# Patient Record
Sex: FEMALE | Race: WHITE | NOT HISPANIC OR LATINO
[De-identification: names, ages, dates, MRNs, and addresses within clinical notes are randomized per-mention and may not be internally consistent; named-entity substitution may affect disease eponyms.]

---

## 2017-04-28 ENCOUNTER — APPOINTMENT (OUTPATIENT)
Dept: ORTHOPEDIC SURGERY | Facility: CLINIC | Age: 72
End: 2017-04-28

## 2017-05-08 ENCOUNTER — APPOINTMENT (OUTPATIENT)
Dept: ORTHOPEDIC SURGERY | Facility: CLINIC | Age: 72
End: 2017-05-08

## 2017-05-26 ENCOUNTER — APPOINTMENT (OUTPATIENT)
Dept: ORTHOPEDIC SURGERY | Facility: CLINIC | Age: 72
End: 2017-05-26

## 2017-12-01 ENCOUNTER — APPOINTMENT (OUTPATIENT)
Dept: ORTHOPEDIC SURGERY | Facility: CLINIC | Age: 72
End: 2017-12-01
Payer: COMMERCIAL

## 2017-12-01 DIAGNOSIS — M67.911 UNSPECIFIED DISORDER OF SYNOVIUM AND TENDON, RIGHT SHOULDER: ICD-10-CM

## 2017-12-01 DIAGNOSIS — M25.511 PAIN IN RIGHT SHOULDER: ICD-10-CM

## 2017-12-01 PROCEDURE — 99213 OFFICE O/P EST LOW 20 MIN: CPT

## 2017-12-01 RX ORDER — ZOLPIDEM TARTRATE 10 MG/1
10 TABLET, FILM COATED ORAL
Qty: 30 | Refills: 0 | Status: ACTIVE | COMMUNITY
Start: 2017-04-13

## 2017-12-01 RX ORDER — ALPRAZOLAM 1 MG/1
1 TABLET ORAL
Qty: 60 | Refills: 0 | Status: ACTIVE | COMMUNITY
Start: 2017-08-04

## 2017-12-01 RX ORDER — METOPROLOL SUCCINATE 50 MG/1
50 TABLET, EXTENDED RELEASE ORAL
Qty: 90 | Refills: 0 | Status: ACTIVE | COMMUNITY
Start: 2016-11-02

## 2017-12-01 RX ORDER — OLMESARTAN MEDOXOMIL 40 MG/1
40 TABLET, FILM COATED ORAL
Qty: 90 | Refills: 0 | Status: ACTIVE | COMMUNITY
Start: 2017-10-05

## 2017-12-01 RX ORDER — GLIMEPIRIDE 2 MG/1
2 TABLET ORAL
Qty: 30 | Refills: 0 | Status: ACTIVE | COMMUNITY
Start: 2017-09-22

## 2017-12-01 RX ORDER — PROCHLORPERAZINE MALEATE 10 MG/1
10 TABLET ORAL
Qty: 30 | Refills: 0 | Status: ACTIVE | COMMUNITY
Start: 2017-09-22

## 2017-12-01 RX ORDER — IBANDRONATE SODIUM 150 MG/1
150 TABLET ORAL
Qty: 3 | Refills: 0 | Status: ACTIVE | COMMUNITY
Start: 2017-08-14

## 2018-01-02 ENCOUNTER — APPOINTMENT (OUTPATIENT)
Dept: ORTHOPEDIC SURGERY | Facility: AMBULATORY SURGERY CENTER | Age: 73
End: 2018-01-02

## 2018-01-08 ENCOUNTER — OUTPATIENT (OUTPATIENT)
Dept: OUTPATIENT SERVICES | Facility: HOSPITAL | Age: 73
LOS: 1 days | End: 2018-01-08

## 2018-01-08 VITALS
WEIGHT: 201.06 LBS | DIASTOLIC BLOOD PRESSURE: 88 MMHG | TEMPERATURE: 99 F | HEART RATE: 64 BPM | RESPIRATION RATE: 16 BRPM | SYSTOLIC BLOOD PRESSURE: 138 MMHG | HEIGHT: 67 IN

## 2018-01-08 DIAGNOSIS — E78.5 HYPERLIPIDEMIA, UNSPECIFIED: ICD-10-CM

## 2018-01-08 DIAGNOSIS — K42.9 UMBILICAL HERNIA WITHOUT OBSTRUCTION OR GANGRENE: Chronic | ICD-10-CM

## 2018-01-08 DIAGNOSIS — Z98.89 OTHER SPECIFIED POSTPROCEDURAL STATES: Chronic | ICD-10-CM

## 2018-01-08 DIAGNOSIS — K46.9 UNSPECIFIED ABDOMINAL HERNIA WITHOUT OBSTRUCTION OR GANGRENE: Chronic | ICD-10-CM

## 2018-01-08 DIAGNOSIS — M81.0 AGE-RELATED OSTEOPOROSIS WITHOUT CURRENT PATHOLOGICAL FRACTURE: ICD-10-CM

## 2018-01-08 DIAGNOSIS — Z90.12 ACQUIRED ABSENCE OF LEFT BREAST AND NIPPLE: Chronic | ICD-10-CM

## 2018-01-08 DIAGNOSIS — K40.90 UNILATERAL INGUINAL HERNIA, WITHOUT OBSTRUCTION OR GANGRENE, NOT SPECIFIED AS RECURRENT: Chronic | ICD-10-CM

## 2018-01-08 DIAGNOSIS — E11.9 TYPE 2 DIABETES MELLITUS WITHOUT COMPLICATIONS: ICD-10-CM

## 2018-01-08 DIAGNOSIS — S43.421A SPRAIN OF RIGHT ROTATOR CUFF CAPSULE, INITIAL ENCOUNTER: ICD-10-CM

## 2018-01-08 DIAGNOSIS — Z95.5 PRESENCE OF CORONARY ANGIOPLASTY IMPLANT AND GRAFT: ICD-10-CM

## 2018-01-08 DIAGNOSIS — Z98.1 ARTHRODESIS STATUS: Chronic | ICD-10-CM

## 2018-01-08 DIAGNOSIS — I25.10 ATHEROSCLEROTIC HEART DISEASE OF NATIVE CORONARY ARTERY WITHOUT ANGINA PECTORIS: Chronic | ICD-10-CM

## 2018-01-08 DIAGNOSIS — C50.912 MALIGNANT NEOPLASM OF UNSPECIFIED SITE OF LEFT FEMALE BREAST: Chronic | ICD-10-CM

## 2018-01-08 DIAGNOSIS — F41.9 ANXIETY DISORDER, UNSPECIFIED: ICD-10-CM

## 2018-01-08 DIAGNOSIS — I25.10 ATHEROSCLEROTIC HEART DISEASE OF NATIVE CORONARY ARTERY WITHOUT ANGINA PECTORIS: ICD-10-CM

## 2018-01-08 DIAGNOSIS — I10 ESSENTIAL (PRIMARY) HYPERTENSION: ICD-10-CM

## 2018-01-08 DIAGNOSIS — C50.919 MALIGNANT NEOPLASM OF UNSPECIFIED SITE OF UNSPECIFIED FEMALE BREAST: Chronic | ICD-10-CM

## 2018-01-08 DIAGNOSIS — I48.91 UNSPECIFIED ATRIAL FIBRILLATION: ICD-10-CM

## 2018-01-08 LAB
ALBUMIN SERPL ELPH-MCNC: 4.2 G/DL — SIGNIFICANT CHANGE UP (ref 3.3–5)
ALP SERPL-CCNC: 33 U/L — LOW (ref 40–120)
ALT FLD-CCNC: 16 U/L — SIGNIFICANT CHANGE UP (ref 4–33)
AST SERPL-CCNC: 24 U/L — SIGNIFICANT CHANGE UP (ref 4–32)
BILIRUB SERPL-MCNC: 0.2 MG/DL — SIGNIFICANT CHANGE UP (ref 0.2–1.2)
BUN SERPL-MCNC: 37 MG/DL — HIGH (ref 7–23)
CALCIUM SERPL-MCNC: 9.6 MG/DL — SIGNIFICANT CHANGE UP (ref 8.4–10.5)
CHLORIDE SERPL-SCNC: 105 MMOL/L — SIGNIFICANT CHANGE UP (ref 98–107)
CO2 SERPL-SCNC: 25 MMOL/L — SIGNIFICANT CHANGE UP (ref 22–31)
CREAT SERPL-MCNC: 1.34 MG/DL — HIGH (ref 0.5–1.3)
GLUCOSE SERPL-MCNC: 75 MG/DL — SIGNIFICANT CHANGE UP (ref 70–99)
HBA1C BLD-MCNC: 6 % — HIGH (ref 4–5.6)
HCT VFR BLD CALC: 35.9 % — SIGNIFICANT CHANGE UP (ref 34.5–45)
HGB BLD-MCNC: 11.3 G/DL — LOW (ref 11.5–15.5)
MCHC RBC-ENTMCNC: 28.3 PG — SIGNIFICANT CHANGE UP (ref 27–34)
MCHC RBC-ENTMCNC: 31.5 % — LOW (ref 32–36)
MCV RBC AUTO: 89.8 FL — SIGNIFICANT CHANGE UP (ref 80–100)
NRBC # FLD: 0 — SIGNIFICANT CHANGE UP
PLATELET # BLD AUTO: 404 K/UL — HIGH (ref 150–400)
PMV BLD: 13.1 FL — HIGH (ref 7–13)
POTASSIUM SERPL-MCNC: 5 MMOL/L — SIGNIFICANT CHANGE UP (ref 3.5–5.3)
POTASSIUM SERPL-SCNC: 5 MMOL/L — SIGNIFICANT CHANGE UP (ref 3.5–5.3)
PROT SERPL-MCNC: 7.2 G/DL — SIGNIFICANT CHANGE UP (ref 6–8.3)
RBC # BLD: 4 M/UL — SIGNIFICANT CHANGE UP (ref 3.8–5.2)
RBC # FLD: 14 % — SIGNIFICANT CHANGE UP (ref 10.3–14.5)
SODIUM SERPL-SCNC: 143 MMOL/L — SIGNIFICANT CHANGE UP (ref 135–145)
WBC # BLD: 11.72 K/UL — HIGH (ref 3.8–10.5)
WBC # FLD AUTO: 11.72 K/UL — HIGH (ref 3.8–10.5)

## 2018-01-08 NOTE — H&P PST ADULT - NEGATIVE GASTROINTESTINAL SYMPTOMS
no abdominal pain/no melena/no constipation/no change in bowel habits/no vomiting/no nausea/no diarrhea

## 2018-01-08 NOTE — H&P PST ADULT - PSH
Breast cancer  left lumpectomy with post op chemo and RT  CAD (coronary artery disease)  one cardiac stent placed in 1991 -- had surgery at [facility]  Hernia  diaphramatic hernia age 8  Inguinal hernia  right inguinal hernia repair  Recurrent breast cancer, left  1991 left mastectomy with reconstruction ("from the back") and the nipple -- no post op chemo or RT  S/P hernia repair    S/P lumpectomy, left breast    S/P mastectomy, left    S/P spinal fusion    Umbilical hernia

## 2018-01-08 NOTE — H&P PST ADULT - PMH
Anxiety    Atrial fibrillation    Atrial fibrillation    Breast cancer  left breast cancer diagnose din 1986 -- had lumpectomy with post op chemo and RT  Breast cancer  s/p left lumpectomy and mastectomy  CAD (coronary artery disease)    CAD (coronary artery disease)  s/p 1 stent (1999)  Diabetes  type II diagnosed in 2003 -- doesn't do finger sticks  Diabetes mellitus  type 2  Elevated WBC count  h/o having elevated WBCs -- as high as 13,000  HLD (hyperlipidemia)    HTN (hypertension)    Hypercholesterolemia    Hypertension    Lumbar spinal stenosis  November 2014  MI (myocardial infarction)  1999, then had cardiac stent inserted  Myocardial infarct    Obesity    Osteoporosis    Recurrent breast cancer, left  diagnosed in 1991 -- had surgery and no post op chemo or RT  Reflux    Sciatica neuralgia, left  in 2014 due to spinal stenosis

## 2018-01-08 NOTE — H&P PST ADULT - ALLERGY TYPES
see allergy list/outdoor environmental allergies/indoor environmental allergies/reactions to medicines

## 2018-01-08 NOTE — H&P PST ADULT - PROBLEM SELECTOR PLAN 5
Pt instructed to take meds as prescribed.  Metformin & Glimepiride instructions given  Accu-Chek ordered STAT for day of procedure  OR booking notification done

## 2018-01-08 NOTE — H&P PST ADULT - MUSCULOSKELETAL
negative detailed exam decreased ROM due to pain/normal strength/no joint warmth/no calf tenderness/no joint swelling/no joint erythema

## 2018-01-08 NOTE — H&P PST ADULT - FAMILY HISTORY
Mother  Still living? No  Family history of breast cancer, Age at diagnosis: Age Unknown     Father  Still living? No  Family history of emphysema, Age at diagnosis: Age Unknown

## 2018-01-08 NOTE — H&P PST ADULT - HISTORY OF PRESENT ILLNESS
73yo female with medical h/o Afib on Xarelto, HTN, CAD with stent placed 1991, T2DM, Osteoporosis, HDL and prior h/o Breast, left breast, mastectomy 1990 with chemo and radiation. Pt reports pain and decrease range of motion to right shoulder for a few years. Pt presents today for presurgical evaluation for Right Shoulder Arthroscopy scheduled for 1/11/2018.

## 2018-01-08 NOTE — H&P PST ADULT - RS GEN PE MLT RESP DETAILS PC
breath sounds equal/good air movement/airway patent/clear to auscultation bilaterally/respirations non-labored

## 2018-01-08 NOTE — H&P PST ADULT - NEGATIVE CARDIOVASCULAR SYMPTOMS
no dyspnea on exertion/no orthopnea/no paroxysmal nocturnal dyspnea/no peripheral edema/no palpitations/no chest pain

## 2018-01-11 ENCOUNTER — TRANSCRIPTION ENCOUNTER (OUTPATIENT)
Age: 73
End: 2018-01-11

## 2018-01-11 ENCOUNTER — OUTPATIENT (OUTPATIENT)
Dept: OUTPATIENT SERVICES | Facility: HOSPITAL | Age: 73
LOS: 1 days | Discharge: HOME | End: 2018-01-11

## 2018-01-11 VITALS
HEART RATE: 74 BPM | DIASTOLIC BLOOD PRESSURE: 69 MMHG | HEIGHT: 67 IN | WEIGHT: 201.06 LBS | RESPIRATION RATE: 16 BRPM | TEMPERATURE: 98 F | OXYGEN SATURATION: 98 % | SYSTOLIC BLOOD PRESSURE: 151 MMHG

## 2018-01-11 VITALS
OXYGEN SATURATION: 95 % | RESPIRATION RATE: 18 BRPM | DIASTOLIC BLOOD PRESSURE: 62 MMHG | SYSTOLIC BLOOD PRESSURE: 145 MMHG

## 2018-01-11 DIAGNOSIS — Z98.89 OTHER SPECIFIED POSTPROCEDURAL STATES: Chronic | ICD-10-CM

## 2018-01-11 DIAGNOSIS — K46.9 UNSPECIFIED ABDOMINAL HERNIA WITHOUT OBSTRUCTION OR GANGRENE: Chronic | ICD-10-CM

## 2018-01-11 DIAGNOSIS — Z98.1 ARTHRODESIS STATUS: Chronic | ICD-10-CM

## 2018-01-11 DIAGNOSIS — K40.90 UNILATERAL INGUINAL HERNIA, WITHOUT OBSTRUCTION OR GANGRENE, NOT SPECIFIED AS RECURRENT: Chronic | ICD-10-CM

## 2018-01-11 DIAGNOSIS — Z90.12 ACQUIRED ABSENCE OF LEFT BREAST AND NIPPLE: Chronic | ICD-10-CM

## 2018-01-11 DIAGNOSIS — K42.9 UMBILICAL HERNIA WITHOUT OBSTRUCTION OR GANGRENE: Chronic | ICD-10-CM

## 2018-01-11 DIAGNOSIS — I25.10 ATHEROSCLEROTIC HEART DISEASE OF NATIVE CORONARY ARTERY WITHOUT ANGINA PECTORIS: Chronic | ICD-10-CM

## 2018-01-11 DIAGNOSIS — C50.912 MALIGNANT NEOPLASM OF UNSPECIFIED SITE OF LEFT FEMALE BREAST: Chronic | ICD-10-CM

## 2018-01-11 DIAGNOSIS — S43.421A SPRAIN OF RIGHT ROTATOR CUFF CAPSULE, INITIAL ENCOUNTER: ICD-10-CM

## 2018-01-11 DIAGNOSIS — C50.919 MALIGNANT NEOPLASM OF UNSPECIFIED SITE OF UNSPECIFIED FEMALE BREAST: Chronic | ICD-10-CM

## 2018-01-11 LAB — GLUCOSE BLDC GLUCOMTR-MCNC: 104 MG/DL — HIGH (ref 70–99)

## 2018-01-11 NOTE — ASU DISCHARGE PLAN (ADULT/PEDIATRIC). - NOTIFY
Unable to Urinate/Increased Irritability or Sluggishness/Bleeding that does not stop/Pain not relieved by Medications/Numbness, color, or temperature change to extremity/Persistent Nausea and Vomiting/Inability to Tolerate Liquids or Foods/Swelling that continues/Fever greater than 101

## 2018-01-11 NOTE — ASU DISCHARGE PLAN (ADULT/PEDIATRIC). - ACTIVITY LEVEL
no heavy lifting/no weight bearing/no exercise/no sports/gym/quiet play/elevate extremity/no tub baths

## 2018-01-11 NOTE — ASU DISCHARGE PLAN (ADULT/PEDIATRIC). - SPECIAL INSTRUCTIONS
Apply ice for 20 minutes several times per day for the next 24-48 hours, then as needed for comfort. No creams, lotions, powders  or ointments to incision site. Narcotic pain medication may cause nausea or constipation. Take medication with food. Increase fluids and fiber intake. Wear shoulder immobilizer at all times...

## 2018-01-12 ENCOUNTER — APPOINTMENT (OUTPATIENT)
Dept: ORTHOPEDIC SURGERY | Facility: CLINIC | Age: 73
End: 2018-01-12

## 2018-06-25 NOTE — H&P PST ADULT - BACK
SCHEDULING [facility] LAB APPOINTMENTS ONLINE    Lab appointments can now be scheduled online at [email or web address]    · Go to [email or web address]  · In Search type Lab  · Click \"Lab services\"  · Click \"Schedule Your Test Online\"  · Follow the prompts  · If you
detailed exam

## 2018-09-09 ENCOUNTER — TRANSCRIPTION ENCOUNTER (OUTPATIENT)
Age: 73
End: 2018-09-09

## 2019-01-01 ENCOUNTER — INPATIENT (INPATIENT)
Facility: HOSPITAL | Age: 74
LOS: 2 days | Discharge: HOME | DRG: 247 | End: 2019-01-04
Attending: INTERNAL MEDICINE | Admitting: INTERNAL MEDICINE
Payer: COMMERCIAL

## 2019-01-01 VITALS
HEART RATE: 74 BPM | TEMPERATURE: 98 F | OXYGEN SATURATION: 95 % | SYSTOLIC BLOOD PRESSURE: 156 MMHG | RESPIRATION RATE: 18 BRPM | WEIGHT: 190.04 LBS | HEIGHT: 68 IN | DIASTOLIC BLOOD PRESSURE: 72 MMHG

## 2019-01-01 DIAGNOSIS — Z90.12 ACQUIRED ABSENCE OF LEFT BREAST AND NIPPLE: Chronic | ICD-10-CM

## 2019-01-01 DIAGNOSIS — R07.9 CHEST PAIN, UNSPECIFIED: ICD-10-CM

## 2019-01-01 DIAGNOSIS — I25.10 ATHEROSCLEROTIC HEART DISEASE OF NATIVE CORONARY ARTERY WITHOUT ANGINA PECTORIS: Chronic | ICD-10-CM

## 2019-01-01 DIAGNOSIS — C50.919 MALIGNANT NEOPLASM OF UNSPECIFIED SITE OF UNSPECIFIED FEMALE BREAST: Chronic | ICD-10-CM

## 2019-01-01 DIAGNOSIS — K46.9 UNSPECIFIED ABDOMINAL HERNIA WITHOUT OBSTRUCTION OR GANGRENE: Chronic | ICD-10-CM

## 2019-01-01 DIAGNOSIS — Z98.89 OTHER SPECIFIED POSTPROCEDURAL STATES: Chronic | ICD-10-CM

## 2019-01-01 DIAGNOSIS — Z98.1 ARTHRODESIS STATUS: Chronic | ICD-10-CM

## 2019-01-01 DIAGNOSIS — K40.90 UNILATERAL INGUINAL HERNIA, WITHOUT OBSTRUCTION OR GANGRENE, NOT SPECIFIED AS RECURRENT: Chronic | ICD-10-CM

## 2019-01-01 DIAGNOSIS — K42.9 UMBILICAL HERNIA WITHOUT OBSTRUCTION OR GANGRENE: Chronic | ICD-10-CM

## 2019-01-01 DIAGNOSIS — C50.912 MALIGNANT NEOPLASM OF UNSPECIFIED SITE OF LEFT FEMALE BREAST: Chronic | ICD-10-CM

## 2019-01-01 LAB
ALBUMIN SERPL ELPH-MCNC: 4.1 G/DL — SIGNIFICANT CHANGE UP (ref 3.3–5)
ALP SERPL-CCNC: 41 U/L — SIGNIFICANT CHANGE UP (ref 40–120)
ALT FLD-CCNC: 17 U/L — SIGNIFICANT CHANGE UP (ref 10–45)
ANION GAP SERPL CALC-SCNC: 17 MMOL/L — SIGNIFICANT CHANGE UP (ref 5–17)
ANION GAP SERPL CALC-SCNC: 17 MMOL/L — SIGNIFICANT CHANGE UP (ref 5–17)
APTT BLD: 36.8 SEC — HIGH (ref 27.5–36.3)
APTT BLD: >200 SEC — CRITICAL HIGH (ref 27.5–36.3)
AST SERPL-CCNC: 22 U/L — SIGNIFICANT CHANGE UP (ref 10–40)
BILIRUB SERPL-MCNC: 0.2 MG/DL — SIGNIFICANT CHANGE UP (ref 0.2–1.2)
BUN SERPL-MCNC: 45 MG/DL — HIGH (ref 7–23)
BUN SERPL-MCNC: 45 MG/DL — HIGH (ref 7–23)
CALCIUM SERPL-MCNC: 9.5 MG/DL — SIGNIFICANT CHANGE UP (ref 8.4–10.5)
CALCIUM SERPL-MCNC: 9.5 MG/DL — SIGNIFICANT CHANGE UP (ref 8.4–10.5)
CHLORIDE SERPL-SCNC: 104 MMOL/L — SIGNIFICANT CHANGE UP (ref 96–108)
CHLORIDE SERPL-SCNC: 106 MMOL/L — SIGNIFICANT CHANGE UP (ref 96–108)
CO2 SERPL-SCNC: 20 MMOL/L — LOW (ref 22–31)
CO2 SERPL-SCNC: 21 MMOL/L — LOW (ref 22–31)
CREAT SERPL-MCNC: 1.46 MG/DL — HIGH (ref 0.5–1.3)
CREAT SERPL-MCNC: 1.57 MG/DL — HIGH (ref 0.5–1.3)
GLUCOSE SERPL-MCNC: 155 MG/DL — HIGH (ref 70–99)
GLUCOSE SERPL-MCNC: 95 MG/DL — SIGNIFICANT CHANGE UP (ref 70–99)
HCT VFR BLD CALC: 35.3 % — SIGNIFICANT CHANGE UP (ref 34.5–45)
HGB BLD-MCNC: 11.6 G/DL — SIGNIFICANT CHANGE UP (ref 11.5–15.5)
INR BLD: 1.42 RATIO — HIGH (ref 0.88–1.16)
INR BLD: >15 RATIO (ref 0.88–1.16)
MCHC RBC-ENTMCNC: 27.8 PG — SIGNIFICANT CHANGE UP (ref 27–34)
MCHC RBC-ENTMCNC: 32.7 GM/DL — SIGNIFICANT CHANGE UP (ref 32–36)
MCV RBC AUTO: 84.9 FL — SIGNIFICANT CHANGE UP (ref 80–100)
PLATELET # BLD AUTO: 314 K/UL — SIGNIFICANT CHANGE UP (ref 150–400)
POTASSIUM SERPL-MCNC: 4.6 MMOL/L — SIGNIFICANT CHANGE UP (ref 3.5–5.3)
POTASSIUM SERPL-MCNC: 5.2 MMOL/L — SIGNIFICANT CHANGE UP (ref 3.5–5.3)
POTASSIUM SERPL-SCNC: 4.6 MMOL/L — SIGNIFICANT CHANGE UP (ref 3.5–5.3)
POTASSIUM SERPL-SCNC: 5.2 MMOL/L — SIGNIFICANT CHANGE UP (ref 3.5–5.3)
PROT SERPL-MCNC: 6.8 G/DL — SIGNIFICANT CHANGE UP (ref 6–8.3)
PROTHROM AB SERPL-ACNC: 16.4 SEC — HIGH (ref 10–12.9)
PROTHROM AB SERPL-ACNC: >200 SEC — SIGNIFICANT CHANGE UP (ref 10–12.9)
RBC # BLD: 4.16 M/UL — SIGNIFICANT CHANGE UP (ref 3.8–5.2)
RBC # FLD: 13 % — SIGNIFICANT CHANGE UP (ref 10.3–14.5)
SODIUM SERPL-SCNC: 141 MMOL/L — SIGNIFICANT CHANGE UP (ref 135–145)
SODIUM SERPL-SCNC: 144 MMOL/L — SIGNIFICANT CHANGE UP (ref 135–145)
TROPONIN T, HIGH SENSITIVITY RESULT: 15 NG/L — SIGNIFICANT CHANGE UP (ref 0–51)
TROPONIN T, HIGH SENSITIVITY RESULT: 16 NG/L — SIGNIFICANT CHANGE UP (ref 0–51)
WBC # BLD: 9.7 K/UL — SIGNIFICANT CHANGE UP (ref 3.8–10.5)
WBC # FLD AUTO: 9.7 K/UL — SIGNIFICANT CHANGE UP (ref 3.8–10.5)

## 2019-01-01 PROCEDURE — 71045 X-RAY EXAM CHEST 1 VIEW: CPT | Mod: 26

## 2019-01-01 PROCEDURE — 71275 CT ANGIOGRAPHY CHEST: CPT | Mod: 26

## 2019-01-01 PROCEDURE — 74174 CTA ABD&PLVS W/CONTRAST: CPT | Mod: 26

## 2019-01-01 RX ORDER — ASPIRIN/CALCIUM CARB/MAGNESIUM 324 MG
81 TABLET ORAL DAILY
Qty: 0 | Refills: 0 | Status: DISCONTINUED | OUTPATIENT
Start: 2019-01-01 | End: 2019-01-04

## 2019-01-01 RX ORDER — RIVAROXABAN 15 MG-20MG
20 KIT ORAL EVERY 24 HOURS
Qty: 0 | Refills: 0 | Status: DISCONTINUED | OUTPATIENT
Start: 2019-01-01 | End: 2019-01-01

## 2019-01-01 RX ORDER — METOPROLOL TARTRATE 50 MG
25 TABLET ORAL DAILY
Qty: 0 | Refills: 0 | Status: DISCONTINUED | OUTPATIENT
Start: 2019-01-01 | End: 2019-01-01

## 2019-01-01 RX ORDER — DIPHENHYDRAMINE HCL 50 MG
50 CAPSULE ORAL ONCE
Qty: 0 | Refills: 0 | Status: DISCONTINUED | OUTPATIENT
Start: 2019-01-01 | End: 2019-01-01

## 2019-01-01 RX ORDER — SODIUM CHLORIDE 9 MG/ML
1000 INJECTION, SOLUTION INTRAVENOUS
Qty: 0 | Refills: 0 | Status: DISCONTINUED | OUTPATIENT
Start: 2019-01-01 | End: 2019-01-04

## 2019-01-01 RX ORDER — SIMVASTATIN 20 MG/1
10 TABLET, FILM COATED ORAL AT BEDTIME
Qty: 0 | Refills: 0 | Status: DISCONTINUED | OUTPATIENT
Start: 2019-01-01 | End: 2019-01-01

## 2019-01-01 RX ORDER — ALPRAZOLAM 0.25 MG
1 TABLET ORAL DAILY
Qty: 0 | Refills: 0 | Status: DISCONTINUED | OUTPATIENT
Start: 2019-01-01 | End: 2019-01-04

## 2019-01-01 RX ORDER — ATORVASTATIN CALCIUM 80 MG/1
40 TABLET, FILM COATED ORAL AT BEDTIME
Qty: 0 | Refills: 0 | Status: DISCONTINUED | OUTPATIENT
Start: 2019-01-01 | End: 2019-01-04

## 2019-01-01 RX ORDER — DEXTROSE 50 % IN WATER 50 %
25 SYRINGE (ML) INTRAVENOUS ONCE
Qty: 0 | Refills: 0 | Status: DISCONTINUED | OUTPATIENT
Start: 2019-01-01 | End: 2019-01-04

## 2019-01-01 RX ORDER — FENOFIBRATE,MICRONIZED 130 MG
145 CAPSULE ORAL DAILY
Qty: 0 | Refills: 0 | Status: DISCONTINUED | OUTPATIENT
Start: 2019-01-01 | End: 2019-01-04

## 2019-01-01 RX ORDER — ONDANSETRON 8 MG/1
4 TABLET, FILM COATED ORAL ONCE
Qty: 0 | Refills: 0 | Status: COMPLETED | OUTPATIENT
Start: 2019-01-01 | End: 2019-01-01

## 2019-01-01 RX ORDER — INSULIN LISPRO 100/ML
VIAL (ML) SUBCUTANEOUS
Qty: 0 | Refills: 0 | Status: DISCONTINUED | OUTPATIENT
Start: 2019-01-01 | End: 2019-01-04

## 2019-01-01 RX ORDER — DEXTROSE 50 % IN WATER 50 %
12.5 SYRINGE (ML) INTRAVENOUS ONCE
Qty: 0 | Refills: 0 | Status: DISCONTINUED | OUTPATIENT
Start: 2019-01-01 | End: 2019-01-04

## 2019-01-01 RX ORDER — HYDROCORTISONE 20 MG
200 TABLET ORAL ONCE
Qty: 0 | Refills: 0 | Status: COMPLETED | OUTPATIENT
Start: 2019-01-01 | End: 2019-01-01

## 2019-01-01 RX ORDER — SODIUM CHLORIDE 9 MG/ML
1000 INJECTION, SOLUTION INTRAVENOUS ONCE
Qty: 0 | Refills: 0 | Status: COMPLETED | OUTPATIENT
Start: 2019-01-01 | End: 2019-01-01

## 2019-01-01 RX ORDER — METOPROLOL TARTRATE 50 MG
50 TABLET ORAL DAILY
Qty: 0 | Refills: 0 | Status: DISCONTINUED | OUTPATIENT
Start: 2019-01-01 | End: 2019-01-03

## 2019-01-01 RX ORDER — DEXTROSE 50 % IN WATER 50 %
15 SYRINGE (ML) INTRAVENOUS ONCE
Qty: 0 | Refills: 0 | Status: DISCONTINUED | OUTPATIENT
Start: 2019-01-01 | End: 2019-01-04

## 2019-01-01 RX ORDER — GLUCAGON INJECTION, SOLUTION 0.5 MG/.1ML
1 INJECTION, SOLUTION SUBCUTANEOUS ONCE
Qty: 0 | Refills: 0 | Status: DISCONTINUED | OUTPATIENT
Start: 2019-01-01 | End: 2019-01-04

## 2019-01-01 RX ORDER — DIPHENHYDRAMINE HCL 50 MG
50 CAPSULE ORAL ONCE
Qty: 0 | Refills: 0 | Status: COMPLETED | OUTPATIENT
Start: 2019-01-01 | End: 2019-01-01

## 2019-01-01 RX ORDER — LOSARTAN POTASSIUM 100 MG/1
100 TABLET, FILM COATED ORAL DAILY
Qty: 0 | Refills: 0 | Status: DISCONTINUED | OUTPATIENT
Start: 2019-01-01 | End: 2019-01-02

## 2019-01-01 RX ADMIN — Medication 200 MILLIGRAM(S): at 17:47

## 2019-01-01 RX ADMIN — Medication 50 MILLIGRAM(S): at 21:16

## 2019-01-01 RX ADMIN — ONDANSETRON 4 MILLIGRAM(S): 8 TABLET, FILM COATED ORAL at 17:44

## 2019-01-01 RX ADMIN — SODIUM CHLORIDE 2000 MILLILITER(S): 9 INJECTION, SOLUTION INTRAVENOUS at 17:51

## 2019-01-01 NOTE — ED ADULT NURSE NOTE - NSIMPLEMENTINTERV_GEN_ALL_ED
Implemented All Fall with Harm Risk Interventions:  [place] to call system. Call bell, personal items and telephone within reach. Instruct patient to call for assistance. Room bathroom lighting operational. Non-slip footwear when patient is off stretcher. Physically safe environment: no spills, clutter or unnecessary equipment. Stretcher in lowest position, wheels locked, appropriate side rails in place. Provide visual cue, wrist band, yellow gown, etc. Monitor gait and stability. Monitor for mental status changes and reorient to person, place, and time. Review medications for side effects contributing to fall risk. Reinforce activity limits and safety measures with patient and family. Provide visual clues: red socks.

## 2019-01-01 NOTE — H&P ADULT - ASSESSMENT
pt  with h/o afib/  xarelto, cad , mi,  stent  1999    now admitted  with  cp  and sob  tele   troponin   echo   may  need  cardiac cath    labs, pending  on  asa/  statin/ toprol  ct  scans, pending pt  with h/o afib/  xarelto, cad , mi,  stent  1999    now admitted  with  cp  and sob  tele   troponin negative  in er   echo     cardiac cath in  am/  xarelto on hold  on  asa/  statin/ toprol  ct  scans, pending

## 2019-01-01 NOTE — CONSULT NOTE ADULT - SUBJECTIVE AND OBJECTIVE BOX
CHIEF COMPLAINT:Patient is a 73y old  Female who presents with a chief complaint of cp (01 Jan 2019 18:12)      HPI:  73F pmh afib on xarelto, MI s/p stent in 1999  p/w 2 hours of squeezing chest pain associated with shortness of breath, nausea and palpitations.   Pt states with her prior MI she had back pain which she is not having at this time .  Pt took aspirin and her xarelto this morning along with her metoprolol.   Pt states she was feeling so bad that she took an extra dose of her metoprolol prior to the ER.   Pt says her cardiologist was planning to do another angiogram in the near future once her BP was better under control.  [place] [facility] [place]      PAST MEDICAL & SURGICAL HISTORY:  CAD (coronary artery disease)  htn  a.fib    MEDICATIONS  (STANDING):  diphenhydrAMINE   Injectable 50 milliGRAM(s) IV Push Once    MEDICATIONS  (PRN):      FAMILY HISTORY:      SOCIAL HISTORY:    [ ] Non-smoker  [ ] Smoker  [ ] Alcohol    Allergies    No Known Allergies    Intolerances    	    REVIEW OF SYSTEMS:  CONSTITUTIONAL: No fever, weight loss, or fatigue  EYES: No eye pain, visual disturbances, or discharge  ENT:  No difficulty hearing, tinnitus, vertigo; No sinus or throat pain  NECK: No pain or stiffness  RESPIRATORY: No cough, wheezing, chills or hemoptysis; + Shortness of Breath  CARDIOVASCULAR: + chest pain, no palpitations, passing out, dizziness, or leg swelling  GASTROINTESTINAL: No abdominal or epigastric pain. No nausea, vomiting, or hematemesis; No diarrhea or constipation. No melena or hematochezia.  GENITOURINARY: No dysuria, frequency, hematuria, or incontinence  NEUROLOGICAL: No headaches, memory loss, loss of strength, numbness, or tremors  SKIN: No itching, burning, rashes, or lesions   LYMPH Nodes: No enlarged glands  ENDOCRINE: No heat or cold intolerance; No hair loss  MUSCULOSKELETAL: No joint pain or swelling; No muscle, back, or extremity pain  PSYCHIATRIC: No depression, anxiety, mood swings, or difficulty sleeping  HEME/LYMPH: No easy bruising, or bleeding gums  ALLERGY AND IMMUNOLOGIC: No hives or eczema	    [ ] All others negative	  [ ] Unable to obtain    PHYSICAL EXAM:  T(C): 36.7 (01-01-19 @ 15:32), Max: 36.7 (01-01-19 @ 15:32)  HR: 72 (01-01-19 @ 16:00) (72 - 74)  BP: 126/55 (01-01-19 @ 16:00) (126/55 - 156/72)  RR: 16 (01-01-19 @ 16:00) (16 - 18)  SpO2: 96% (01-01-19 @ 16:00) (95% - 96%)  Wt(kg): --  I&O's Summary      Appearance: Normal	  HEENT:   Normal oral mucosa, PERRL, EOMI	  Lymphatic: No lymphadenopathy  Cardiovascular: Normal S1 S2, No JVD, + murmurs, No edema  Respiratory: Lungs clear to auscultation	  Psychiatry: A & O x 3, Mood & affect appropriate  Gastrointestinal:  Soft, Non-tender, + BS	  Skin: No rashes, No ecchymoses, No cyanosis	  Neurologic: Non-focal  Extremities: Normal range of motion, No clubbing, cyanosis or edema  Vascular: Peripheral pulses palpable 2+ bilaterally    TELEMETRY: 	    ECG:  	  RADIOLOGY:  OTHER: 	  	  LABS:	 	    CARDIAC MARKERS:                              11.6   9.7   )-----------( 314      ( 01 Jan 2019 16:34 )             35.3     01-01    141  |  104  |  45<H>  ----------------------------<  155<H>  4.6   |  20<L>  |  1.57<H>    Ca    9.5      01 Jan 2019 16:34    TPro  6.8  /  Alb  4.1  /  TBili  0.2  /  DBili  x   /  AST  22  /  ALT  17  /  AlkPhos  41  01-01    proBNP:   Lipid Profile:   HgA1c:   TSH:   PT/INR - ( 01 Jan 2019 17:57 )   PT: 16.4 sec;   INR: 1.42 ratio         PTT - ( 01 Jan 2019 17:57 )  PTT:36.8 sec    PREVIOUS DIAGNOSTIC TESTING:    < from: Xray Chest 1 View AP/PA (01.01.19 @ 16:38) >  INTERPRETATION:  no emergent finding  follow up official report    ecg nsr ivcd,nstt changes

## 2019-01-01 NOTE — H&P ADULT - NSHPLABSRESULTS_GEN_ALL_CORE
LABS:                        11.6   9.7   )-----------( 314      ( 01 Jan 2019 16:34 )             35.3     01-01    141  |  104  |  45<H>  ----------------------------<  155<H>  4.6   |  20<L>  |  1.57<H>    Ca    9.5      01 Jan 2019 16:34    TPro  6.8  /  Alb  4.1  /  TBili  0.2  /  DBili  x   /  AST  22  /  ALT  17  /  AlkPhos  41  01-01    PT/INR - ( 01 Jan 2019 17:57 )   PT: 16.4 sec;   INR: 1.42 ratio         PTT - ( 01 Jan 2019 17:57 )  PTT:36.8 sec

## 2019-01-01 NOTE — ED ADULT NURSE NOTE - OBJECTIVE STATEMENT
74 y/o F pt w/ pmh of a-fib, MI w/ stent, CAD, present to ED, palpitation, SOB and chest pain, pt symptoms started this afternoon while she was sitting down, palpitation started first, then pt became SOB and midsternal chest pain, pt report that it was chest tightness, on exam pt having midsternal chest pain, pain describes as squeezing in nature, non exertional, non radiating, currently SOB and nausea, lung b/l CTA, CM NSR peripheral pulses strong and present, denies fever, chills, dizziness, V/D, seen and raimundo WYATT, labs drawn and sent

## 2019-01-01 NOTE — CONSULT NOTE ADULT - ASSESSMENT
pt with hx of cad , PAF ,htn with new onset of chest pain.  pt thought she is in a,fib and took an extra dose of toprol  awaiting ct  repeat cardiac enzymes  echo  will consider stress test vs cath  tele pt with hx of cad , PAF ,htn with new onset of chest pain.  pt thought she is in a,fib and took an extra dose of Toprol  awaiting ct  repeat cardiac enzymes  echo  will consider  cath in am  tele  hold melonie

## 2019-01-01 NOTE — ED PROVIDER NOTE - ATTENDING CONTRIBUTION TO CARE
Attending MD Kraus:  I personally have seen and examined this patient.  Resident note reviewed and agree on plan of care and except where noted.  See HPI, PE, and MDM for details.

## 2019-01-01 NOTE — ED PROVIDER NOTE - OBJECTIVE STATEMENT
73F pmh afib, MI s/p stent in 1999 p/w 2 hours of squeezing chest pain associated with shortness of breath and nausea. 73F pmh afib on xarelto, MI s/p stent in 1999 p/w 2 hours of squeezing chest pain associated with shortness of breath, nausea and palpitations.  Pt states with her prior MI she had back pain which she is not having at this time.  Pt took aspirin and her xarelto this morning along with her metoprolol.  Pt states she was feeling so bad that she took an extra dose of her metoprolol prior to the ER.  Pt says her cardiologist was planning to do another angiogram in the near future once her BP was better under control.  Cards: Dr. Farrell

## 2019-01-01 NOTE — ED ADULT NURSE REASSESSMENT NOTE - NS ED NURSE REASSESS COMMENT FT1
Report received from Alexander MOODY. Pt AAOx4, NAD, resp nonlabored, skin warm/dry, resting comfortably in bed with family at bedside. Pt c/o "feeling hungry" . Pt denies headache, dizziness, chest pain, palpitations, SOB, abd pain, n/v/d, urinary symptoms, fevers, chills, weakness at this time. Pt awaiting CT scan. Pt to receive IV benadryl at 2120. Safety maintained. Continuous cardiac monitoring maintained. Report received from Alexander MOODY. Pt AAOx4, NAD, resp nonlabored, skin warm/dry, resting comfortably in bed with family at bedside. Pt c/o "feeling hungry" . Pt denies headache, dizziness, chest pain, palpitations, SOB, abd pain, n/v/d, urinary symptoms, fevers, chills, weakness at this time. Pt awaiting CT scan. Pt to receive IV benadryl at 2120 per order. Safety maintained. Continuous cardiac monitoring maintained.

## 2019-01-01 NOTE — H&P ADULT - NSHPPHYSICALEXAM_GEN_ALL_CORE
PHYSICAL EXAMINATION:  Vital Signs Last 24 Hrs  T(C): 36.7 (01 Jan 2019 15:32), Max: 36.7 (01 Jan 2019 15:32)  T(F): 98.1 (01 Jan 2019 15:32), Max: 98.1 (01 Jan 2019 15:32)  HR: 72 (01 Jan 2019 16:00) (72 - 74)  BP: 126/55 (01 Jan 2019 16:00) (126/55 - 156/72)  BP(mean): --  RR: 16 (01 Jan 2019 16:00) (16 - 18)  SpO2: 96% (01 Jan 2019 16:00) (95% - 96%)  CAPILLARY BLOOD GLUCOSE            GENERAL: NAD, well-groomed,  HEAD:  atraumatic, normocephalic  EYES: sclera anicteric  ENMT: mucous membranes moist  NECK: supple, No JVD  CHEST/LUNG: clear to auscultation bilaterally;    no      rales   ,   no rhonchi,   HEART: normal S1, S2  ABDOMEN: BS+, soft, ND, NT   EXTREMITIES:    no    edema    b/l LEs  NEURO: awake, ,     moves all extremities  SKIN: no     rash

## 2019-01-01 NOTE — ED PROVIDER NOTE - PHYSICAL EXAMINATION
Attending MD Kraus:    Gen:  anxious appearing, oriented x 3  Neck: supple, no swelling, trachea midline  CV: heart with reg rhythm, no obvious murmur appreciated   Resp: CTAB, breathing comfortably  Abd: soft, NT, ND  Extremities: extremities warm to the touch, no peripheral edema   Msk: no extremity deformities or bony tenderness  Pysch: appropriate affect    Neuro: moves all extremities spontaneously, no gross motor or sensory deficits     peripheral pulses full and equal in bilateral upper and lower extremities

## 2019-01-01 NOTE — ED PROVIDER NOTE - PROGRESS NOTE DETAILS
Attending MD Kraus: chest pain worsening and now c/o back pain, dissection a consideration now so will obtain CTA to ro. repeat EKG still with no diagnostic changes of acute ischemia. Will continue to perform serial ekgs to ensure no evolution Attending MD Kraus: patient now reports she is allergic to IV contrast dye, likely anaphylaxis. Patient sent back to ED. Suspicion for dissection at this juncture now high enough to proceed with emergent CTA, will pre treat and perform 4 hour CTA, if symptoms worsen, will proceed with emergent CTA

## 2019-01-01 NOTE — H&P ADULT - HISTORY OF PRESENT ILLNESS
73F pmh afib on xarelto, MI s/p stent in 1999  p/w 2 hours of squeezing chest pain associated with shortness of breath, nausea and palpitations.   Pt states with her prior MI she had back pain which she is not having at this time .  Pt took aspirin and her xarelto this morning along with her metoprolol.   Pt states she was feeling so bad that she took an extra dose of her metoprolol prior to the ER.   Pt says her cardiologist was planning to do another angiogram in the near future once her BP was better under control.  Cards: Dr. Farrell

## 2019-01-01 NOTE — ED ADULT NURSE REASSESSMENT NOTE - NS ED NURSE REASSESS COMMENT FT1
Patient a&ox3, no acute distress, resp nonlabored, resting comfortably in bed with family at bedside.  VSS NAD.  Denies headache, dizziness, chest pain, palpitations, SOB, abd pain, n/v/d, urinary symptoms, fevers, chills, weakness at this time. Currently admitted awaiting inpatient bed placement.  Safety maintained. Patient a&ox3, no acute distress, resp nonlabored, resting comfortably in bed with family at bedside.  VSS NAD.  Denies headache, dizziness, chest pain, palpitations, SOB, abd pain, n/v/d, urinary symptoms, fevers, chills, weakness at this time.  Pt. provided with meal. Per MD Kraus ok to eat. Currently admitted awaiting inpatient bed placement.  Safety maintained.

## 2019-01-01 NOTE — ED PROVIDER NOTE - MEDICAL DECISION MAKING DETAILS
Attending MD Kraus: 73F with afib on Xarelto, CAD sp PCI presenting with palpitations, dyspnea and chest pressure, patient believes she was in afib and took extra metoprolol PTA, EKG on arrival NSR with PACs, no overt ischemic changes. DDx includes ACS vs symptomatic afib, plan for tele, labs, cardiac biomarkers, likely admission

## 2019-01-02 LAB
ANION GAP SERPL CALC-SCNC: 11 MMOL/L — SIGNIFICANT CHANGE UP (ref 5–17)
BUN SERPL-MCNC: 39 MG/DL — HIGH (ref 7–23)
CALCIUM SERPL-MCNC: 9.1 MG/DL — SIGNIFICANT CHANGE UP (ref 8.4–10.5)
CHLORIDE SERPL-SCNC: 106 MMOL/L — SIGNIFICANT CHANGE UP (ref 96–108)
CO2 SERPL-SCNC: 24 MMOL/L — SIGNIFICANT CHANGE UP (ref 22–31)
CREAT SERPL-MCNC: 1.46 MG/DL — HIGH (ref 0.5–1.3)
GLUCOSE BLDC GLUCOMTR-MCNC: 122 MG/DL — HIGH (ref 70–99)
GLUCOSE BLDC GLUCOMTR-MCNC: 67 MG/DL — LOW (ref 70–99)
GLUCOSE BLDC GLUCOMTR-MCNC: 71 MG/DL — SIGNIFICANT CHANGE UP (ref 70–99)
GLUCOSE BLDC GLUCOMTR-MCNC: 84 MG/DL — SIGNIFICANT CHANGE UP (ref 70–99)
GLUCOSE BLDC GLUCOMTR-MCNC: 86 MG/DL — SIGNIFICANT CHANGE UP (ref 70–99)
GLUCOSE SERPL-MCNC: 153 MG/DL — HIGH (ref 70–99)
MAGNESIUM SERPL-MCNC: 1.7 MG/DL — SIGNIFICANT CHANGE UP (ref 1.6–2.6)
POTASSIUM SERPL-MCNC: 4 MMOL/L — SIGNIFICANT CHANGE UP (ref 3.5–5.3)
POTASSIUM SERPL-SCNC: 4 MMOL/L — SIGNIFICANT CHANGE UP (ref 3.5–5.3)
SODIUM SERPL-SCNC: 141 MMOL/L — SIGNIFICANT CHANGE UP (ref 135–145)

## 2019-01-02 RX ORDER — ZOLPIDEM TARTRATE 10 MG/1
5 TABLET ORAL ONCE
Qty: 0 | Refills: 0 | Status: DISCONTINUED | OUTPATIENT
Start: 2019-01-02 | End: 2019-01-02

## 2019-01-02 RX ORDER — ASPIRIN/CALCIUM CARB/MAGNESIUM 324 MG
81 TABLET ORAL DAILY
Qty: 0 | Refills: 0 | Status: DISCONTINUED | OUTPATIENT
Start: 2019-01-02 | End: 2019-01-02

## 2019-01-02 RX ORDER — SODIUM CHLORIDE 9 MG/ML
1000 INJECTION INTRAMUSCULAR; INTRAVENOUS; SUBCUTANEOUS
Qty: 0 | Refills: 0 | Status: DISCONTINUED | OUTPATIENT
Start: 2019-01-02 | End: 2019-01-03

## 2019-01-02 RX ORDER — ZOLPIDEM TARTRATE 10 MG/1
5 TABLET ORAL AT BEDTIME
Qty: 0 | Refills: 0 | Status: DISCONTINUED | OUTPATIENT
Start: 2019-01-02 | End: 2019-01-04

## 2019-01-02 RX ORDER — PANTOPRAZOLE SODIUM 20 MG/1
40 TABLET, DELAYED RELEASE ORAL
Qty: 0 | Refills: 0 | Status: DISCONTINUED | OUTPATIENT
Start: 2019-01-02 | End: 2019-01-04

## 2019-01-02 RX ADMIN — SODIUM CHLORIDE 75 MILLILITER(S): 9 INJECTION INTRAMUSCULAR; INTRAVENOUS; SUBCUTANEOUS at 09:32

## 2019-01-02 RX ADMIN — Medication 145 MILLIGRAM(S): at 21:54

## 2019-01-02 RX ADMIN — Medication 81 MILLIGRAM(S): at 18:53

## 2019-01-02 RX ADMIN — ZOLPIDEM TARTRATE 5 MILLIGRAM(S): 10 TABLET ORAL at 22:46

## 2019-01-02 RX ADMIN — ATORVASTATIN CALCIUM 40 MILLIGRAM(S): 80 TABLET, FILM COATED ORAL at 21:54

## 2019-01-02 RX ADMIN — SODIUM CHLORIDE 75 MILLILITER(S): 9 INJECTION INTRAMUSCULAR; INTRAVENOUS; SUBCUTANEOUS at 21:56

## 2019-01-02 RX ADMIN — Medication 1 MILLIGRAM(S): at 21:59

## 2019-01-02 RX ADMIN — ZOLPIDEM TARTRATE 5 MILLIGRAM(S): 10 TABLET ORAL at 00:19

## 2019-01-02 RX ADMIN — PANTOPRAZOLE SODIUM 40 MILLIGRAM(S): 20 TABLET, DELAYED RELEASE ORAL at 21:54

## 2019-01-02 NOTE — PROGRESS NOTE ADULT - SUBJECTIVE AND OBJECTIVE BOX
CARDIOLOGY     PROGRESS  NOTE   ________________________________________________    CHIEF COMPLAINT:Patient is a 73y old  Female who presents with a chief complaint of cp (02 Jan 2019 07:38)  doing well.  	  REVIEW OF SYSTEMS:  CONSTITUTIONAL: No fever, weight loss, or fatigue  EYES: No eye pain, visual disturbances, or discharge  ENT:  No difficulty hearing, tinnitus, vertigo; No sinus or throat pain  NECK: No pain or stiffness  RESPIRATORY: No cough, wheezing, chills or hemoptysis; No Shortness of Breath  CARDIOVASCULAR: No chest pain, palpitations, passing out, dizziness, or leg swelling  GASTROINTESTINAL: No abdominal or epigastric pain. No nausea, vomiting, or hematemesis; No diarrhea or constipation. No melena or hematochezia.  GENITOURINARY: No dysuria, frequency, hematuria, or incontinence  NEUROLOGICAL: No headaches, memory loss, loss of strength, numbness, or tremors  SKIN: No itching, burning, rashes, or lesions   LYMPH Nodes: No enlarged glands  ENDOCRINE: No heat or cold intolerance; No hair loss  MUSCULOSKELETAL: No joint pain or swelling; No muscle, back, or extremity pain  PSYCHIATRIC: No depression, anxiety, mood swings, or difficulty sleeping  HEME/LYMPH: No easy bruising, or bleeding gums  ALLERGY AND IMMUNOLOGIC: No hives or eczema	    [ ] All others negative	  [ ] Unable to obtain    PHYSICAL EXAM:  T(C): 36.4 (01-02-19 @ 05:07), Max: 37.1 (01-01-19 @ 20:09)  HR: 79 (01-02-19 @ 07:54) (68 - 79)  BP: 163/96 (01-02-19 @ 07:54) (111/64 - 163/96)  RR: 18 (01-02-19 @ 07:54) (16 - 18)  SpO2: 98% (01-02-19 @ 07:54) (95% - 98%)  Wt(kg): --  I&O's Summary      Appearance: Normal	  HEENT:   Normal oral mucosa, PERRL, EOMI	  Lymphatic: No lymphadenopathy  Cardiovascular: Normal S1 S2, No JVD, + murmurs, No edema  Respiratory: Lungs clear to auscultation	  Psychiatry: A & O x 3, Mood & affect appropriate  Gastrointestinal:  Soft, Non-tender, + BS	  Skin: No rashes, No ecchymoses, No cyanosis	  Neurologic: Non-focal  Extremities: Normal range of motion, No clubbing, cyanosis or edema  Vascular: Peripheral pulses palpable 2+ bilaterally    MEDICATIONS  (STANDING):  aspirin enteric coated 81 milliGRAM(s) Oral daily  atorvastatin 40 milliGRAM(s) Oral at bedtime  dextrose 5%. 1000 milliLiter(s) (50 mL/Hr) IV Continuous <Continuous>  dextrose 50% Injectable 12.5 Gram(s) IV Push once  dextrose 50% Injectable 25 Gram(s) IV Push once  dextrose 50% Injectable 25 Gram(s) IV Push once  fenofibrate Tablet 145 milliGRAM(s) Oral daily  insulin lispro (HumaLOG) corrective regimen sliding scale   SubCutaneous three times a day before meals  losartan 100 milliGRAM(s) Oral daily  metoprolol succinate ER 50 milliGRAM(s) Oral daily      TELEMETRY: 	    ECG:  	  RADIOLOGY:  OTHER: 	  	  LABS:	 	    CARDIAC MARKERS:                                11.6   9.7   )-----------( 314      ( 01 Jan 2019 16:34 )             35.3     01-01    144  |  106  |  45<H>  ----------------------------<  95  5.2   |  21<L>  |  1.46<H>    Ca    9.5      01 Jan 2019 20:55    TPro  6.8  /  Alb  4.1  /  TBili  0.2  /  DBili  x   /  AST  22  /  ALT  17  /  AlkPhos  41  01-01    proBNP:   Lipid Profile:   HgA1c:   TSH:   PT/INR - ( 01 Jan 2019 17:57 )   PT: 16.4 sec;   INR: 1.42 ratio         PTT - ( 01 Jan 2019 17:57 )  PTT:36.8 sec      Assessment and plan  ---------------------------  start ivf   cath today  asa  beta blocker  lipitor

## 2019-01-02 NOTE — PROGRESS NOTE ADULT - ASSESSMENT
pt  with h/o afib/  xarelto, cad , mi,  stent  1999    now admitted  with  cp  and sob  tele   troponin negative  in er   echo     cardiac cath     xarelto on hold  on  asa/  statin/ toprol  ct  scans, no  dissection pt  with h/o afib/  xarelto, cad , mi,  stent  1999    now admitted  with  cp  and sob  tele   troponin negative  in er   echo     cardiac cath / iv fluids, for  mild  ckd  2    xarelto on hold  on  asa/  statin/ toprol  ct  scans, no  dissection

## 2019-01-02 NOTE — PROGRESS NOTE ADULT - SUBJECTIVE AND OBJECTIVE BOX
no  cp/sob    REVIEW OF SYSTEMS:  GEN: no fever,    no chills  RESP: no SOB,   no cough  CVS: no chest pain,   no palpitations  GI: no abdominal pain,   no nausea,   no vomiting,   no constipation,   no diarrhea  : no dysuria,   no frequency  NEURO: no headache,   no dizziness  PSYCH: no depression,   not anxious  Derm : no rash    MEDICATIONS  (STANDING):  aspirin enteric coated 81 milliGRAM(s) Oral daily  atorvastatin 40 milliGRAM(s) Oral at bedtime  dextrose 5%. 1000 milliLiter(s) (50 mL/Hr) IV Continuous <Continuous>  dextrose 50% Injectable 12.5 Gram(s) IV Push once  dextrose 50% Injectable 25 Gram(s) IV Push once  dextrose 50% Injectable 25 Gram(s) IV Push once  fenofibrate Tablet 145 milliGRAM(s) Oral daily  insulin lispro (HumaLOG) corrective regimen sliding scale   SubCutaneous three times a day before meals  losartan 100 milliGRAM(s) Oral daily  metoprolol succinate ER 50 milliGRAM(s) Oral daily    MEDICATIONS  (PRN):  ALPRAZolam 1 milliGRAM(s) Oral daily PRN anxiety  dextrose 40% Gel 15 Gram(s) Oral once PRN Blood Glucose LESS THAN 70 milliGRAM(s)/deciliter  glucagon  Injectable 1 milliGRAM(s) IntraMuscular once PRN Glucose LESS THAN 70 milligrams/deciliter      Vital Signs Last 24 Hrs  T(C): 36.4 (02 Jan 2019 05:07), Max: 37.1 (01 Jan 2019 20:09)  T(F): 97.6 (02 Jan 2019 05:07), Max: 98.7 (01 Jan 2019 20:09)  HR: 73 (02 Jan 2019 05:07) (68 - 74)  BP: 111/64 (02 Jan 2019 05:07) (111/64 - 156/77)  BP(mean): --  RR: 17 (02 Jan 2019 05:07) (16 - 18)  SpO2: 95% (02 Jan 2019 05:07) (95% - 96%)  CAPILLARY BLOOD GLUCOSE        I&O's Summary      PHYSICAL EXAM:  HEAD:  Atraumatic, Normocephalic  NECK: Supple, No   JVD  CHEST/LUNG:   no     rales,     no,    rhonchi  HEART: Regular rate and rhythm;         murmur  ABDOMEN: Soft, Nontender, ;   EXTREMITIES:   no     edema  NEUROLOGY:  alert    LABS:                        11.6   9.7   )-----------( 314      ( 01 Jan 2019 16:34 )             35.3     01-01    144  |  106  |  45<H>  ----------------------------<  95  5.2   |  21<L>  |  1.46<H>    Ca    9.5      01 Jan 2019 20:55    TPro  6.8  /  Alb  4.1  /  TBili  0.2  /  DBili  x   /  AST  22  /  ALT  17  /  AlkPhos  41  01-01    PT/INR - ( 01 Jan 2019 17:57 )   PT: 16.4 sec;   INR: 1.42 ratio         PTT - ( 01 Jan 2019 17:57 )  PTT:36.8 sec                        Consultant(s) Notes Reviewed:      Care Discussed with Consultants/Other Providers:

## 2019-01-03 ENCOUNTER — TRANSCRIPTION ENCOUNTER (OUTPATIENT)
Age: 74
End: 2019-01-03

## 2019-01-03 LAB
ANION GAP SERPL CALC-SCNC: 14 MMOL/L — SIGNIFICANT CHANGE UP (ref 5–17)
ANION GAP SERPL CALC-SCNC: 15 MMOL/L — SIGNIFICANT CHANGE UP (ref 5–17)
APTT BLD: 29.1 SEC — SIGNIFICANT CHANGE UP (ref 27.5–36.3)
BUN SERPL-MCNC: 34 MG/DL — HIGH (ref 7–23)
BUN SERPL-MCNC: 36 MG/DL — HIGH (ref 7–23)
CALCIUM SERPL-MCNC: 8.6 MG/DL — SIGNIFICANT CHANGE UP (ref 8.4–10.5)
CALCIUM SERPL-MCNC: 9.4 MG/DL — SIGNIFICANT CHANGE UP (ref 8.4–10.5)
CHLORIDE SERPL-SCNC: 106 MMOL/L — SIGNIFICANT CHANGE UP (ref 96–108)
CHLORIDE SERPL-SCNC: 109 MMOL/L — HIGH (ref 96–108)
CO2 SERPL-SCNC: 21 MMOL/L — LOW (ref 22–31)
CO2 SERPL-SCNC: 23 MMOL/L — SIGNIFICANT CHANGE UP (ref 22–31)
CREAT SERPL-MCNC: 1.35 MG/DL — HIGH (ref 0.5–1.3)
CREAT SERPL-MCNC: 1.64 MG/DL — HIGH (ref 0.5–1.3)
GLUCOSE BLDC GLUCOMTR-MCNC: 142 MG/DL — HIGH (ref 70–99)
GLUCOSE BLDC GLUCOMTR-MCNC: 157 MG/DL — HIGH (ref 70–99)
GLUCOSE BLDC GLUCOMTR-MCNC: 226 MG/DL — HIGH (ref 70–99)
GLUCOSE BLDC GLUCOMTR-MCNC: 264 MG/DL — HIGH (ref 70–99)
GLUCOSE BLDC GLUCOMTR-MCNC: 72 MG/DL — SIGNIFICANT CHANGE UP (ref 70–99)
GLUCOSE SERPL-MCNC: 248 MG/DL — HIGH (ref 70–99)
GLUCOSE SERPL-MCNC: 71 MG/DL — SIGNIFICANT CHANGE UP (ref 70–99)
HBA1C BLD-MCNC: 5.9 % — HIGH (ref 4–5.6)
HCT VFR BLD CALC: 30.6 % — LOW (ref 34.5–45)
HCT VFR BLD CALC: 32.9 % — LOW (ref 34.5–45)
HCT VFR BLD CALC: 34 % — LOW (ref 34.5–45)
HGB BLD-MCNC: 10.3 G/DL — LOW (ref 11.5–15.5)
HGB BLD-MCNC: 10.8 G/DL — LOW (ref 11.5–15.5)
HGB BLD-MCNC: 11.1 G/DL — LOW (ref 11.5–15.5)
INR BLD: 0.97 RATIO — SIGNIFICANT CHANGE UP (ref 0.88–1.16)
MAGNESIUM SERPL-MCNC: 1.7 MG/DL — SIGNIFICANT CHANGE UP (ref 1.6–2.6)
MCHC RBC-ENTMCNC: 28.1 PG — SIGNIFICANT CHANGE UP (ref 27–34)
MCHC RBC-ENTMCNC: 28.3 PG — SIGNIFICANT CHANGE UP (ref 27–34)
MCHC RBC-ENTMCNC: 28.9 PG — SIGNIFICANT CHANGE UP (ref 27–34)
MCHC RBC-ENTMCNC: 32.7 GM/DL — SIGNIFICANT CHANGE UP (ref 32–36)
MCHC RBC-ENTMCNC: 33 GM/DL — SIGNIFICANT CHANGE UP (ref 32–36)
MCHC RBC-ENTMCNC: 33.6 GM/DL — SIGNIFICANT CHANGE UP (ref 32–36)
MCV RBC AUTO: 85.6 FL — SIGNIFICANT CHANGE UP (ref 80–100)
MCV RBC AUTO: 85.9 FL — SIGNIFICANT CHANGE UP (ref 80–100)
MCV RBC AUTO: 85.9 FL — SIGNIFICANT CHANGE UP (ref 80–100)
PLATELET # BLD AUTO: 322 K/UL — SIGNIFICANT CHANGE UP (ref 150–400)
PLATELET # BLD AUTO: 327 K/UL — SIGNIFICANT CHANGE UP (ref 150–400)
PLATELET # BLD AUTO: 338 K/UL — SIGNIFICANT CHANGE UP (ref 150–400)
POTASSIUM SERPL-MCNC: 4.4 MMOL/L — SIGNIFICANT CHANGE UP (ref 3.5–5.3)
POTASSIUM SERPL-MCNC: 4.6 MMOL/L — SIGNIFICANT CHANGE UP (ref 3.5–5.3)
POTASSIUM SERPL-SCNC: 4.4 MMOL/L — SIGNIFICANT CHANGE UP (ref 3.5–5.3)
POTASSIUM SERPL-SCNC: 4.6 MMOL/L — SIGNIFICANT CHANGE UP (ref 3.5–5.3)
PROTHROM AB SERPL-ACNC: 11.1 SEC — SIGNIFICANT CHANGE UP (ref 10–12.9)
RBC # BLD: 3.56 M/UL — LOW (ref 3.8–5.2)
RBC # BLD: 3.84 M/UL — SIGNIFICANT CHANGE UP (ref 3.8–5.2)
RBC # BLD: 3.96 M/UL — SIGNIFICANT CHANGE UP (ref 3.8–5.2)
RBC # FLD: 12.9 % — SIGNIFICANT CHANGE UP (ref 10.3–14.5)
RBC # FLD: 13.3 % — SIGNIFICANT CHANGE UP (ref 10.3–14.5)
RBC # FLD: 13.4 % — SIGNIFICANT CHANGE UP (ref 10.3–14.5)
SODIUM SERPL-SCNC: 142 MMOL/L — SIGNIFICANT CHANGE UP (ref 135–145)
SODIUM SERPL-SCNC: 146 MMOL/L — HIGH (ref 135–145)
WBC # BLD: 11.3 K/UL — HIGH (ref 3.8–10.5)
WBC # BLD: 12.7 K/UL — HIGH (ref 3.8–10.5)
WBC # BLD: 9.7 K/UL — SIGNIFICANT CHANGE UP (ref 3.8–10.5)
WBC # FLD AUTO: 11.3 K/UL — HIGH (ref 3.8–10.5)
WBC # FLD AUTO: 12.7 K/UL — HIGH (ref 3.8–10.5)
WBC # FLD AUTO: 9.7 K/UL — SIGNIFICANT CHANGE UP (ref 3.8–10.5)

## 2019-01-03 PROCEDURE — 93010 ELECTROCARDIOGRAM REPORT: CPT

## 2019-01-03 PROCEDURE — 99152 MOD SED SAME PHYS/QHP 5/>YRS: CPT | Mod: GC

## 2019-01-03 PROCEDURE — 93454 CORONARY ARTERY ANGIO S&I: CPT | Mod: 26,59,GC

## 2019-01-03 PROCEDURE — 92928 PRQ TCAT PLMT NTRAC ST 1 LES: CPT | Mod: LD,GC

## 2019-01-03 RX ORDER — CLOPIDOGREL BISULFATE 75 MG/1
75 TABLET, FILM COATED ORAL DAILY
Qty: 0 | Refills: 0 | Status: DISCONTINUED | OUTPATIENT
Start: 2019-01-03 | End: 2019-01-04

## 2019-01-03 RX ORDER — ASPIRIN/CALCIUM CARB/MAGNESIUM 324 MG
1 TABLET ORAL
Qty: 0 | Refills: 0 | COMMUNITY
Start: 2019-01-03

## 2019-01-03 RX ORDER — ZOLPIDEM TARTRATE 10 MG/1
5 TABLET ORAL AT BEDTIME
Qty: 0 | Refills: 0 | Status: DISCONTINUED | OUTPATIENT
Start: 2019-01-03 | End: 2019-01-04

## 2019-01-03 RX ORDER — HYDROCORTISONE 20 MG
200 TABLET ORAL ONCE
Qty: 0 | Refills: 0 | Status: COMPLETED | OUTPATIENT
Start: 2019-01-03 | End: 2019-01-03

## 2019-01-03 RX ORDER — RIVAROXABAN 15 MG-20MG
15 KIT ORAL EVERY 24 HOURS
Qty: 0 | Refills: 0 | Status: DISCONTINUED | OUTPATIENT
Start: 2019-01-04 | End: 2019-01-04

## 2019-01-03 RX ORDER — SODIUM CHLORIDE 9 MG/ML
1000 INJECTION INTRAMUSCULAR; INTRAVENOUS; SUBCUTANEOUS
Qty: 0 | Refills: 0 | Status: DISCONTINUED | OUTPATIENT
Start: 2019-01-03 | End: 2019-01-04

## 2019-01-03 RX ORDER — ALPRAZOLAM 0.25 MG
0.5 TABLET ORAL ONCE
Qty: 0 | Refills: 0 | Status: DISCONTINUED | OUTPATIENT
Start: 2019-01-03 | End: 2019-01-04

## 2019-01-03 RX ORDER — RIVAROXABAN 15 MG-20MG
1 KIT ORAL
Qty: 0 | Refills: 0 | COMMUNITY
Start: 2019-01-03

## 2019-01-03 RX ORDER — LABETALOL HCL 100 MG
10 TABLET ORAL ONCE
Qty: 0 | Refills: 0 | Status: COMPLETED | OUTPATIENT
Start: 2019-01-03 | End: 2019-01-03

## 2019-01-03 RX ORDER — DIPHENHYDRAMINE HCL 50 MG
50 CAPSULE ORAL ONCE
Qty: 0 | Refills: 0 | Status: COMPLETED | OUTPATIENT
Start: 2019-01-03 | End: 2019-01-03

## 2019-01-03 RX ORDER — POLYETHYLENE GLYCOL 3350 17 G/17G
17 POWDER, FOR SOLUTION ORAL DAILY
Qty: 0 | Refills: 0 | Status: DISCONTINUED | OUTPATIENT
Start: 2019-01-03 | End: 2019-01-04

## 2019-01-03 RX ORDER — DOCUSATE SODIUM 100 MG
100 CAPSULE ORAL
Qty: 0 | Refills: 0 | Status: DISCONTINUED | OUTPATIENT
Start: 2019-01-03 | End: 2019-01-04

## 2019-01-03 RX ORDER — CLOPIDOGREL BISULFATE 75 MG/1
1 TABLET, FILM COATED ORAL
Qty: 30 | Refills: 11 | OUTPATIENT
Start: 2019-01-03 | End: 2019-12-28

## 2019-01-03 RX ORDER — METOPROLOL TARTRATE 50 MG
200 TABLET ORAL DAILY
Qty: 0 | Refills: 0 | Status: DISCONTINUED | OUTPATIENT
Start: 2019-01-03 | End: 2019-01-04

## 2019-01-03 RX ORDER — METOPROLOL TARTRATE 50 MG
50 TABLET ORAL ONCE
Qty: 0 | Refills: 0 | Status: COMPLETED | OUTPATIENT
Start: 2019-01-03 | End: 2019-01-03

## 2019-01-03 RX ADMIN — Medication 145 MILLIGRAM(S): at 12:19

## 2019-01-03 RX ADMIN — Medication 50 MILLIGRAM(S): at 19:23

## 2019-01-03 RX ADMIN — Medication 1 MILLIGRAM(S): at 22:37

## 2019-01-03 RX ADMIN — Medication 81 MILLIGRAM(S): at 05:08

## 2019-01-03 RX ADMIN — Medication 200 MILLIGRAM(S): at 12:46

## 2019-01-03 RX ADMIN — Medication 50 MILLIGRAM(S): at 05:08

## 2019-01-03 RX ADMIN — Medication 50 MILLIGRAM(S): at 12:45

## 2019-01-03 RX ADMIN — Medication 10 MILLIGRAM(S): at 13:52

## 2019-01-03 RX ADMIN — ZOLPIDEM TARTRATE 5 MILLIGRAM(S): 10 TABLET ORAL at 22:35

## 2019-01-03 RX ADMIN — Medication 1 MILLIGRAM(S): at 09:52

## 2019-01-03 RX ADMIN — ATORVASTATIN CALCIUM 40 MILLIGRAM(S): 80 TABLET, FILM COATED ORAL at 22:35

## 2019-01-03 RX ADMIN — PANTOPRAZOLE SODIUM 40 MILLIGRAM(S): 20 TABLET, DELAYED RELEASE ORAL at 05:08

## 2019-01-03 NOTE — DISCHARGE NOTE ADULT - HOSPITAL COURSE
73F pmh afib on xarelto, MI s/p stent in 1999,  p/w chest pain associated with shortness of breath, nausea and palpitations. Pt took an extra dose of her metoprolol prior to the ER. Pt says her cardiologist was planning to do another angiogram in the near future once her BP was better controlled.  Pt now s/p PCI with mLAD 90% stenosis, VINAY x1. Pt tolerated procedure well. Right radial site with ecchymosis but soft to touch. Post procedure discharge instructions discussed and questions addressed.

## 2019-01-03 NOTE — CONSULT NOTE ADULT - SUBJECTIVE AND OBJECTIVE BOX
Patient is a 73y old  Female who presented with a chief complaint of cp (03 Jan 2019 09:02)      HPI:  73F pmh Breast CA, afib on xarelto, MI s/p stent in 1999  p/w 2 hours of squeezing chest pain associated with shortness of breath, nausea and palpitations.   Pt states with her prior MI she had back pain which she is not having at this time .  Pt took aspirin and her xarelto this morning along with her metoprolol.   Pt states she was feeling so bad that she took an extra dose of her metoprolol prior to the ER.   Pt says her cardiologist was planning to do another angiogram in the near future once her BP was better under control.  Cards: Dr. Farrell (01 Jan 2019 18:12)    Today underwent Cardiac Cath with PCI to LAD  area of 90% stenosis  We are asked to evaluate for anemia - states baseline anemia, followed by Dr Villalobos (GI) had positive Cologard test 1-2 years ago, underwent virtual colonoscopy, colonoscopy, EGD, [facility]  all ~1.5 years ago  No diverticulosis +polyps (gastric as per pt)  Brown stools reported per pt with regular bowel habits, last BM today AM  no rectal bleed, or melena  no hemetemesis  no personal or FH of GI malignancy      PAST MEDICAL & SURGICAL HISTORY:  CAD (coronary artery disease)  HTN  HLD  Breast CA    Allergies  IV Contrast (Anaphylaxis)      MEDICATIONS  (STANDING):  ALPRAZolam 0.5 milliGRAM(s) Oral once  aspirin enteric coated 81 milliGRAM(s) Oral daily  atorvastatin 40 milliGRAM(s) Oral at bedtime  clopidogrel Tablet 75 milliGRAM(s) Oral daily  dextrose 5%. 1000 milliLiter(s) (50 mL/Hr) IV Continuous <Continuous>  dextrose 50% Injectable 12.5 Gram(s) IV Push once  dextrose 50% Injectable 25 Gram(s) IV Push once  dextrose 50% Injectable 25 Gram(s) IV Push once  fenofibrate Tablet 145 milliGRAM(s) Oral daily  insulin lispro (HumaLOG) corrective regimen sliding scale   SubCutaneous three times a day before meals  metoprolol succinate ER 50 milliGRAM(s) Oral daily  pantoprazole    Tablet 40 milliGRAM(s) Oral before breakfast  sodium chloride 0.9%. 1000 milliLiter(s) (75 mL/Hr) IV Continuous <Continuous>  sodium chloride 0.9%. 1000 milliLiter(s) (75 mL/Hr) IV Continuous <Continuous>    MEDICATIONS  (PRN):  ALPRAZolam 1 milliGRAM(s) Oral daily PRN anxiety  dextrose 40% Gel 15 Gram(s) Oral once PRN Blood Glucose LESS THAN 70 milliGRAM(s)/deciliter  glucagon  Injectable 1 milliGRAM(s) IntraMuscular once PRN Glucose LESS THAN 70 milligrams/deciliter  zolpidem 5 milliGRAM(s) Oral at bedtime PRN Insomnia      Social History:    Marital Status:  ( X  )    (   ) Single    (   )    (  )   Lives with: (  ) alone  (  ) children   ( X ) spouse   (  ) parents  (  ) other    Substance Use (street drugs): (  ) never used  (  ) other:  Tobacco Usage:  (   ) never smoked   (   ) former smoker   (   ) current smoker  (     ) pack year  (        ) last cigarette date  Alcohol Usage:  Sexual History:     Family History   IBD (  ) Yes   (X  ) No  GI Malignancy (  )  Yes    ( X ) No    Health Management  Last Colonoscopy - 1.5 years ago; see HPI       Advanced Directives: (   X  ) None    (      ) DNR    (     ) DNI    (     ) Health Care Proxy:     Review of Systems:    General:  No wt loss, fevers, chills, night sweats,fatigue,   CV:  No pain, palpitatioins, hypo/hypertension  Resp:  No dyspnea, cough, tachypnea, wheezing  GI:  No pain, No nausea, No vomiting, No diarrhea, No constipatiion, No weight loss, No fever, No pruritis, No rectal bleeding, No tarry stools, No dysphagia,  :  No pain, bleeding, incontinence, nocturia  Muscle:  No pain, weakness  Neuro:  No weakness, tingling, memory problems  Psych:  No fatigue, insomnia, mood problems, depression  Endocrine:  No polyuria, polydypsia, cold/heat intolerance  Heme:  No petechiae, ecchymosis, easy bruisability  Skin:  No rash, tattoos, scars, edema      Vital Signs Last 24 Hrs  T(C): 36.5 (03 Jan 2019 12:18), Max: 36.8 (02 Jan 2019 21:38)  T(F): 97.7 (03 Jan 2019 12:18), Max: 98.3 (02 Jan 2019 21:38)  HR: 70 (03 Jan 2019 14:50) (69 - 78)  BP: 184/82 (03 Jan 2019 14:50) (135/74 - 208/87)  BP(mean): --  RR: 18 (03 Jan 2019 14:50) (16 - 18)  SpO2: 98% (03 Jan 2019 14:50) (95% - 99%)    PHYSICAL EXAM:    Constitutional: NAD, well-developed pleasant female, non toxic appearing  Neck: No LAD, supple  Respiratory: good air entry b/l  Cardiovascular: S1 and S2, RRR, +m  Gastrointestinal: BS+, obese soft, NT/ND, neg HSM,  Extremities: No peripheral edema, neg clubbing, cyanosis  Right wrist cath site with pressure dressing in place  Vascular: 2+ peripheral pulses  Neurological: A/O x 3, no focal deficits  Psychiatric: Normal mood, normal affect  Skin: No rashes        LABS:                        11.1   12.7  )-----------( 338      ( 03 Jan 2019 14:55 )             34.0   Hemoglobin: 11.1 g/dL <L> [11.5 - 15.5] (01-03 @ 14:55)  Hemoglobin: 10.3 g/dL <L> [11.5 - 15.5] (01-03 @ 05:06)  Hemoglobin: 11.6 g/dL [11.5 - 15.5] (01-01 @ 16:34)    Mean Cell Volume: 85.9 fl (01.03.19 @ 14:55)        01-03    146<H>  |  109<H>  |  36<H>  ----------------------------<  71  4.4   |  23  |  1.35<H>    Ca    8.6      03 Jan 2019 05:06  Mg     1.7     01-02    TPro  6.8  /  Alb  4.1  /  TBili  0.2  /  DBili  x   /  AST  22  /  ALT  17  /  AlkPhos  41  01-01    PT/INR - ( 03 Jan 2019 05:06 )   PT: 11.1 sec;   INR: 0.97 ratio    PTT - ( 03 Jan 2019 05:06 )  PTT:29.1 sec        RADIOLOGY & ADDITIONAL TESTS:

## 2019-01-03 NOTE — DISCHARGE NOTE ADULT - ADDITIONAL INSTRUCTIONS
Follow up with your cardiologist and primary care provider in 1-2 weeks. Follow up with your cardiologist and primary care provider in 1-2 weeks..

## 2019-01-03 NOTE — DISCHARGE NOTE ADULT - PLAN OF CARE
Patient remains chest pain free and understands post cath discharge instructions post cardiac cath  Do not stop you Aspirin or Plavix unless instructed to do so by your cardiologist.

## 2019-01-03 NOTE — DISCHARGE NOTE ADULT - CARE PLAN
Principal Discharge DX:	CAD (coronary artery disease)  Goal:	Patient remains chest pain free and understands post cath discharge instructions  Assessment and plan of treatment:	post cardiac cath  Do not stop you Aspirin or Plavix unless instructed to do so by your cardiologist.

## 2019-01-03 NOTE — PROGRESS NOTE ADULT - ASSESSMENT
pt  with h/o afib/  xarelto, cad , mi,  stent  1999    now admitted  with  cp  and sob, resolved  tele   troponin negative  in er   echo     cardiac cath, today,  / iv fluids, for  mild  ckd  2    xarelto on hold  on  asa/  statin/ toprol  ct  scans, no  dissection  hypernatremia/  anemia/  gi   eval pt  with h/o afib/  xarelto, cad , mi,  stent  1999    now admitted  with  cp  and sob, resolved  tele   troponin negative  in er   echo     cardiac cath, today,  / iv fluids, for  mild  ckd  2    xarelto on hold  on  asa/  statin/ toprol  ct  scans, no  dissection  hypernatremia/    anemia/  gi   eval/  follow rpt   hb of  10, this  am  pt had  colonoscopy  a year  ago

## 2019-01-03 NOTE — DISCHARGE NOTE ADULT - PATIENT PORTAL LINK FT
You can access the [facility] Portal, offered by [facility], by registering with the following website: [email or web address]

## 2019-01-03 NOTE — CONSULT NOTE ADULT - ASSESSMENT
73F pmh Breast CA, afib on xarelto, MI s/p stent in 1999  p/w 2 hours of squeezing chest pain associated with shortness of breath, nausea and palpitations.  Now s/p cath with PCI to LAD (VINAY)  Anemia - without overt/brisk GI bleed.   Recent extensive work-up with primary GI for +Cologard; EGD, CT Colonography, Colonoscopy, VCE and DBE      PLAN  -Continue PPI  -Monitor CBC and BMs  -No GI objection to Anti-platelet Rx given PCI  -defer timing of resuming AC (Xarelto) to Cardiology    Discussed with pt at bedside    Aric Salcedo PA-C    [facility]  [phone] 73F pmh Breast CA, afib on xarelto, MI s/p stent in 1999  p/w 2 hours of squeezing chest pain associated with shortness of breath, nausea and palpitations.  Now s/p cath with PCI to LAD (VINAY)  Anemia - without overt/brisk GI bleed.   Recent extensive work-up with primary GI for +Cologard; EGD, CT Colonography, Colonoscopy, VCE and DBE  Hgb 11.1      PLAN  -Continue PPI  -Monitor CBC and BMs  -No GI objection to Anti-platelet Rx given PCI  -defer timing of resuming AC (Xarelto) to Cardiology  -No plans for EGD or colonoscopy at this time.    Discussed with pt at bedside    Aric Salcedo PA-C    [facility]  [phone]

## 2019-01-03 NOTE — DISCHARGE NOTE ADULT - CARE PROVIDER_API CALL
Renato Newell (DO), Cardiology Medicine  [address]  [address]  [place] [address]  Phone: [phone]  Fax: [phone]    Nicho Farrell), Cardiovascular Disease; Internal Medicine  [address] [place] [address]  Phone: [phone]  Fax: [phone]

## 2019-01-03 NOTE — PROGRESS NOTE ADULT - SUBJECTIVE AND OBJECTIVE BOX
no  c[p/sob  REVIEW OF SYSTEMS:  GEN: no fever,    no chills  RESP: no SOB,   no cough  CVS: no chest pain,   no palpitations  GI: no abdominal pain,   no nausea,   no vomiting,   no constipation,   no diarrhea  : no dysuria,   no frequency  NEURO: no headache,   no dizziness  PSYCH: no depression,   not anxious  Derm : no rash    MEDICATIONS  (STANDING):  aspirin enteric coated 81 milliGRAM(s) Oral daily  atorvastatin 40 milliGRAM(s) Oral at bedtime  dextrose 5%. 1000 milliLiter(s) (50 mL/Hr) IV Continuous <Continuous>  dextrose 50% Injectable 12.5 Gram(s) IV Push once  dextrose 50% Injectable 25 Gram(s) IV Push once  dextrose 50% Injectable 25 Gram(s) IV Push once  fenofibrate Tablet 145 milliGRAM(s) Oral daily  insulin lispro (HumaLOG) corrective regimen sliding scale   SubCutaneous three times a day before meals  metoprolol succinate ER 50 milliGRAM(s) Oral daily  pantoprazole    Tablet 40 milliGRAM(s) Oral before breakfast  sodium chloride 0.9%. 1000 milliLiter(s) (75 mL/Hr) IV Continuous <Continuous>  sodium chloride 0.9%. 1000 milliLiter(s) (75 mL/Hr) IV Continuous <Continuous>    MEDICATIONS  (PRN):  ALPRAZolam 1 milliGRAM(s) Oral daily PRN anxiety  dextrose 40% Gel 15 Gram(s) Oral once PRN Blood Glucose LESS THAN 70 milliGRAM(s)/deciliter  glucagon  Injectable 1 milliGRAM(s) IntraMuscular once PRN Glucose LESS THAN 70 milligrams/deciliter  zolpidem 5 milliGRAM(s) Oral at bedtime PRN Insomnia      Vital Signs Last 24 Hrs  T(C): 36.4 (03 Jan 2019 04:39), Max: 36.8 (02 Jan 2019 21:38)  T(F): 97.5 (03 Jan 2019 04:39), Max: 98.3 (02 Jan 2019 21:38)  HR: 71 (03 Jan 2019 08:15) (66 - 73)  BP: 135/74 (03 Jan 2019 08:15) (135/74 - 141/74)  BP(mean): --  RR: 16 (03 Jan 2019 08:15) (16 - 18)  SpO2: 99% (03 Jan 2019 08:15) (96% - 99%)  CAPILLARY BLOOD GLUCOSE      POCT Blood Glucose.: 72 mg/dL (03 Jan 2019 07:23)  POCT Blood Glucose.: 122 mg/dL (02 Jan 2019 21:28)  POCT Blood Glucose.: 71 mg/dL (02 Jan 2019 17:56)  POCT Blood Glucose.: 67 mg/dL (02 Jan 2019 17:55)  POCT Blood Glucose.: 84 mg/dL (02 Jan 2019 11:36)    I&O's Summary    02 Jan 2019 07:01  -  03 Jan 2019 07:00  --------------------------------------------------------  IN: 1950 mL / OUT: 0 mL / NET: 1950 mL    03 Jan 2019 07:01  -  03 Jan 2019 09:03  --------------------------------------------------------  IN: 280 mL / OUT: 0 mL / NET: 280 mL        PHYSICAL EXAM:  HEAD:  Atraumatic, Normocephalic  NECK: Supple, No   JVD  CHEST/LUNG:   no     rales,     no,    rhonchi  HEART: Regular rate and rhythm;         murmur  ABDOMEN: Soft, Nontender, ;   EXTREMITIES:    no    edema  NEUROLOGY:  alert    LABS:                        10.3   9.7   )-----------( 322      ( 03 Jan 2019 05:06 )             30.6     01-03    146<H>  |  109<H>  |  36<H>  ----------------------------<  71  4.4   |  23  |  1.35<H>    Ca    8.6      03 Jan 2019 05:06  Mg     1.7     01-02    TPro  6.8  /  Alb  4.1  /  TBili  0.2  /  DBili  x   /  AST  22  /  ALT  17  /  AlkPhos  41  01-01    PT/INR - ( 03 Jan 2019 05:06 )   PT: 11.1 sec;   INR: 0.97 ratio         PTT - ( 03 Jan 2019 05:06 )  PTT:29.1 sec              Hemoglobin A1C, Whole Blood: 5.9 % (01-03 @ 07:33)            Consultant(s) Notes Reviewed:      Care Discussed with Consultants/Other Providers:

## 2019-01-03 NOTE — PROGRESS NOTE ADULT - SUBJECTIVE AND OBJECTIVE BOX
CARDIOLOGY     PROGRESS  NOTE   ________________________________________________    CHIEF COMPLAINT:Patient is a 73y old  Female who presents with a chief complaint of cp (02 Jan 2019 08:47)  doinmg better.  	  REVIEW OF SYSTEMS:  CONSTITUTIONAL: No fever, weight loss, or fatigue  EYES: No eye pain, visual disturbances, or discharge  ENT:  No difficulty hearing, tinnitus, vertigo; No sinus or throat pain  NECK: No pain or stiffness  RESPIRATORY: No cough, wheezing, chills or hemoptysis; No Shortness of Breath  CARDIOVASCULAR: No chest pain, palpitations, passing out, dizziness, or leg swelling  GASTROINTESTINAL: No abdominal or epigastric pain. No nausea, vomiting, or hematemesis; No diarrhea or constipation. No melena or hematochezia.  GENITOURINARY: No dysuria, frequency, hematuria, or incontinence  NEUROLOGICAL: No headaches, memory loss, loss of strength, numbness, or tremors  SKIN: No itching, burning, rashes, or lesions   LYMPH Nodes: No enlarged glands  ENDOCRINE: No heat or cold intolerance; No hair loss  MUSCULOSKELETAL: No joint pain or swelling; No muscle, back, or extremity pain  PSYCHIATRIC: No depression, anxiety, mood swings, or difficulty sleeping  HEME/LYMPH: No easy bruising, or bleeding gums  ALLERGY AND IMMUNOLOGIC: No hives or eczema	    [ ] All others negative	  [ ] Unable to obtain    PHYSICAL EXAM:  T(C): 36.4 (01-03-19 @ 04:39), Max: 36.8 (01-02-19 @ 21:38)  HR: 71 (01-03-19 @ 08:15) (66 - 73)  BP: 135/74 (01-03-19 @ 08:15) (135/74 - 141/74)  RR: 16 (01-03-19 @ 08:15) (16 - 18)  SpO2: 99% (01-03-19 @ 08:15) (96% - 99%)  Wt(kg): --  I&O's Summary    02 Jan 2019 07:01  -  03 Jan 2019 07:00  --------------------------------------------------------  IN: 1950 mL / OUT: 0 mL / NET: 1950 mL    03 Jan 2019 07:01  -  03 Jan 2019 08:59  --------------------------------------------------------  IN: 280 mL / OUT: 0 mL / NET: 280 mL        Appearance: Normal	  HEENT:   Normal oral mucosa, PERRL, EOMI	  Lymphatic: No lymphadenopathy  Cardiovascular: Normal S1 S2, No JVD, + murmurs, No edema  Respiratory: Lungs clear to auscultation	  Psychiatry: A & O x 3, Mood & affect appropriate  Gastrointestinal:  Soft, Non-tender, + BS	  Skin: No rashes, No ecchymoses, No cyanosis	  Neurologic: Non-focal  Extremities: Normal range of motion, No clubbing, cyanosis or edema  Vascular: Peripheral pulses palpable 2+ bilaterally    MEDICATIONS  (STANDING):  aspirin enteric coated 81 milliGRAM(s) Oral daily  atorvastatin 40 milliGRAM(s) Oral at bedtime  dextrose 5%. 1000 milliLiter(s) (50 mL/Hr) IV Continuous <Continuous>  dextrose 50% Injectable 12.5 Gram(s) IV Push once  dextrose 50% Injectable 25 Gram(s) IV Push once  dextrose 50% Injectable 25 Gram(s) IV Push once  fenofibrate Tablet 145 milliGRAM(s) Oral daily  insulin lispro (HumaLOG) corrective regimen sliding scale   SubCutaneous three times a day before meals  metoprolol succinate ER 50 milliGRAM(s) Oral daily  pantoprazole    Tablet 40 milliGRAM(s) Oral before breakfast  sodium chloride 0.9%. 1000 milliLiter(s) (75 mL/Hr) IV Continuous <Continuous>  sodium chloride 0.9%. 1000 milliLiter(s) (75 mL/Hr) IV Continuous <Continuous>      TELEMETRY: 	    ECG:  	  RADIOLOGY:  OTHER: 	  	  LABS:	 	    CARDIAC MARKERS:                                10.3   9.7   )-----------( 322      ( 03 Jan 2019 05:06 )             30.6     01-03    146<H>  |  109<H>  |  36<H>  ----------------------------<  71  4.4   |  23  |  1.35<H>    Ca    8.6      03 Jan 2019 05:06  Mg     1.7     01-02    TPro  6.8  /  Alb  4.1  /  TBili  0.2  /  DBili  x   /  AST  22  /  ALT  17  /  AlkPhos  41  01-01    proBNP:   Lipid Profile:   HgA1c:   TSH:   PT/INR - ( 03 Jan 2019 05:06 )   PT: 11.1 sec;   INR: 0.97 ratio         PTT - ( 03 Jan 2019 05:06 )  PTT:29.1 sec      Assessment and plan  ---------------------------  unstable angina  cath tody  continue ivf  awaiting echo

## 2019-01-03 NOTE — CHART NOTE - NSCHARTNOTEFT_GEN_A_CORE
Patient underwent a PCI procedure and is being admitted as they are at increased risk for major adverse cardiac and vascular events if discharged due to the following high risk characteristics:      ACS: UA  history of hypertensive heart disease  poorly controlled hypertion  significant hypertension    Deuce Carvalho, NP  x [phone]

## 2019-01-04 VITALS
SYSTOLIC BLOOD PRESSURE: 183 MMHG | OXYGEN SATURATION: 97 % | HEART RATE: 68 BPM | DIASTOLIC BLOOD PRESSURE: 84 MMHG | TEMPERATURE: 99 F | RESPIRATION RATE: 18 BRPM

## 2019-01-04 LAB
ANION GAP SERPL CALC-SCNC: 13 MMOL/L — SIGNIFICANT CHANGE UP (ref 5–17)
BUN SERPL-MCNC: 30 MG/DL — HIGH (ref 7–23)
CALCIUM SERPL-MCNC: 9 MG/DL — SIGNIFICANT CHANGE UP (ref 8.4–10.5)
CHLORIDE SERPL-SCNC: 109 MMOL/L — HIGH (ref 96–108)
CO2 SERPL-SCNC: 24 MMOL/L — SIGNIFICANT CHANGE UP (ref 22–31)
CREAT SERPL-MCNC: 1.41 MG/DL — HIGH (ref 0.5–1.3)
GLUCOSE BLDC GLUCOMTR-MCNC: 117 MG/DL — HIGH (ref 70–99)
GLUCOSE BLDC GLUCOMTR-MCNC: 87 MG/DL — SIGNIFICANT CHANGE UP (ref 70–99)
GLUCOSE SERPL-MCNC: 94 MG/DL — SIGNIFICANT CHANGE UP (ref 70–99)
POTASSIUM SERPL-MCNC: 4.2 MMOL/L — SIGNIFICANT CHANGE UP (ref 3.5–5.3)
POTASSIUM SERPL-SCNC: 4.2 MMOL/L — SIGNIFICANT CHANGE UP (ref 3.5–5.3)
SODIUM SERPL-SCNC: 146 MMOL/L — HIGH (ref 135–145)

## 2019-01-04 RX ORDER — SODIUM CHLORIDE 9 MG/ML
1000 INJECTION INTRAMUSCULAR; INTRAVENOUS; SUBCUTANEOUS
Qty: 0 | Refills: 0 | Status: DISCONTINUED | OUTPATIENT
Start: 2019-01-04 | End: 2019-01-04

## 2019-01-04 RX ORDER — RIVAROXABAN 15 MG-20MG
1 KIT ORAL
Qty: 30 | Refills: 1 | OUTPATIENT
Start: 2019-01-04 | End: 2019-03-04

## 2019-01-04 RX ORDER — CLOPIDOGREL BISULFATE 75 MG/1
1 TABLET, FILM COATED ORAL
Qty: 30 | Refills: 11 | OUTPATIENT
Start: 2019-01-04 | End: 2019-12-29

## 2019-01-04 RX ADMIN — PANTOPRAZOLE SODIUM 40 MILLIGRAM(S): 20 TABLET, DELAYED RELEASE ORAL at 05:45

## 2019-01-04 RX ADMIN — Medication 81 MILLIGRAM(S): at 05:45

## 2019-01-04 RX ADMIN — CLOPIDOGREL BISULFATE 75 MILLIGRAM(S): 75 TABLET, FILM COATED ORAL at 05:45

## 2019-01-04 RX ADMIN — SODIUM CHLORIDE 75 MILLILITER(S): 9 INJECTION INTRAMUSCULAR; INTRAVENOUS; SUBCUTANEOUS at 00:27

## 2019-01-04 RX ADMIN — Medication 200 MILLIGRAM(S): at 05:52

## 2019-01-04 NOTE — PROGRESS NOTE ADULT - SUBJECTIVE AND OBJECTIVE BOX
no  cp/sob  REVIEW OF SYSTEMS:  GEN: no fever,    no chills  RESP: no SOB,   no cough  CVS: no chest pain,   no palpitations  GI: no abdominal pain,   no nausea,   no vomiting,   no constipation,   no diarrhea  : no dysuria,   no frequency  NEURO: no headache,   no dizziness  PSYCH: no depression,   not anxious  Derm : no rash    MEDICATIONS  (STANDING):  ALPRAZolam 0.5 milliGRAM(s) Oral once  aspirin enteric coated 81 milliGRAM(s) Oral daily  atorvastatin 40 milliGRAM(s) Oral at bedtime  clopidogrel Tablet 75 milliGRAM(s) Oral daily  dextrose 5%. 1000 milliLiter(s) (50 mL/Hr) IV Continuous <Continuous>  dextrose 50% Injectable 12.5 Gram(s) IV Push once  dextrose 50% Injectable 25 Gram(s) IV Push once  dextrose 50% Injectable 25 Gram(s) IV Push once  docusate sodium 100 milliGRAM(s) Oral two times a day  fenofibrate Tablet 145 milliGRAM(s) Oral daily  insulin lispro (HumaLOG) corrective regimen sliding scale   SubCutaneous three times a day before meals  metoprolol succinate  milliGRAM(s) Oral daily  pantoprazole    Tablet 40 milliGRAM(s) Oral before breakfast  polyethylene glycol 3350 17 Gram(s) Oral daily  rivaroxaban 15 milliGRAM(s) Oral every 24 hours  sodium chloride 0.9%. 1000 milliLiter(s) (100 mL/Hr) IV Continuous <Continuous>  sodium chloride 0.9%. 1000 milliLiter(s) (75 mL/Hr) IV Continuous <Continuous>    MEDICATIONS  (PRN):  ALPRAZolam 1 milliGRAM(s) Oral daily PRN anxiety  dextrose 40% Gel 15 Gram(s) Oral once PRN Blood Glucose LESS THAN 70 milliGRAM(s)/deciliter  glucagon  Injectable 1 milliGRAM(s) IntraMuscular once PRN Glucose LESS THAN 70 milligrams/deciliter  zolpidem 5 milliGRAM(s) Oral at bedtime PRN Insomnia  zolpidem 5 milliGRAM(s) Oral at bedtime PRN Insomnia      Vital Signs Last 24 Hrs  T(C): 36.7 (04 Jan 2019 06:00), Max: 37 (03 Jan 2019 19:05)  T(F): 98.1 (04 Jan 2019 06:00), Max: 98.6 (03 Jan 2019 19:05)  HR: 88 (04 Jan 2019 06:00) (70 - 89)  BP: 193/87 (04 Jan 2019 06:00) (146/69 - 208/87)  BP(mean): --  RR: 18 (04 Jan 2019 06:00) (16 - 18)  SpO2: 95% (04 Jan 2019 06:00) (94% - 99%)  CAPILLARY BLOOD GLUCOSE      POCT Blood Glucose.: 87 mg/dL (04 Jan 2019 07:29)  POCT Blood Glucose.: 264 mg/dL (03 Jan 2019 21:34)  POCT Blood Glucose.: 226 mg/dL (03 Jan 2019 17:56)  POCT Blood Glucose.: 157 mg/dL (03 Jan 2019 13:29)  POCT Blood Glucose.: 142 mg/dL (03 Jan 2019 11:50)    I&O's Summary    03 Jan 2019 07:01  -  04 Jan 2019 07:00  --------------------------------------------------------  IN: 1295 mL / OUT: 0 mL / NET: 1295 mL    04 Jan 2019 07:01  -  04 Jan 2019 09:36  --------------------------------------------------------  IN: 240 mL / OUT: 0 mL / NET: 240 mL        PHYSICAL EXAM:  HEAD:  Atraumatic, Normocephalic  NECK: Supple, No   JVD  CHEST/LUNG:   no     rales,     no,    rhonchi  HEART: Regular rate and rhythm;         murmur  ABDOMEN: Soft, Nontender, ;   EXTREMITIES:     no   edema  NEUROLOGY:  alert    LABS:                        10.8   11.3  )-----------( 327      ( 03 Jan 2019 23:13 )             32.9     01-04    146<H>  |  109<H>  |  30<H>  ----------------------------<  94  4.2   |  24  |  1.41<H>    Ca    9.0      04 Jan 2019 05:48  Mg     1.7     01-03      PT/INR - ( 03 Jan 2019 05:06 )   PT: 11.1 sec;   INR: 0.97 ratio         PTT - ( 03 Jan 2019 05:06 )  PTT:29.1 sec              Hemoglobin A1C, Whole Blood: 5.9 % (01-03 @ 07:33)            Consultant(s) Notes Reviewed:      Care Discussed with Consultants/Other Providers:

## 2019-01-04 NOTE — PROGRESS NOTE ADULT - ASSESSMENT
pt  with h/o afib/  xarelto, cad , mi,  stent  1999    now admitted  with  cp  and sob, resolved  tele   troponin negative  in er   echo     cardiac cath, today,  / iv fluids, for  mild  ckd  2    xarelto  on  asa/  statin/ toprol  ct  scans, no  dissection  hypernatremia, of  146    anemia/  gi   eval/  follow rpt   hb of  10,   pt had  colonoscopy  a year  ago  pt  encouraged to  drink fluids   pmd,  1 week pt  with h/o afib/  xarelto, cad , mi,  stent  1999    now admitted  with  cp  and sob, resolved  tele   troponin negative  in er   echo     cardiac cath, today,  / iv fluids, for  mild  ckd  2/  lad  stent    xarelto  on  asa/  statin/ toprol  ct  scans, no  dissection  hypernatremia, of  146    anemia/  gi   eval/  follow rpt   hb of  10,   pt had  colonoscopy  a year  ago  pt  encouraged to  drink fluids   pmd,  1 week

## 2019-01-04 NOTE — PROGRESS NOTE ADULT - SUBJECTIVE AND OBJECTIVE BOX
Patient is a 73y old  Female who presented with a chief complaint of chest pain (04 Jan 2019 01:01)      INTERVAL HPI/OVERNIGHT EVENTS:  no CP  no rectal bleeding  or melena    MEDICATIONS  (STANDING):  ALPRAZolam 0.5 milliGRAM(s) Oral once  aspirin enteric coated 81 milliGRAM(s) Oral daily  atorvastatin 40 milliGRAM(s) Oral at bedtime  clopidogrel Tablet 75 milliGRAM(s) Oral daily  dextrose 5%. 1000 milliLiter(s) (50 mL/Hr) IV Continuous <Continuous>  dextrose 50% Injectable 12.5 Gram(s) IV Push once  dextrose 50% Injectable 25 Gram(s) IV Push once  dextrose 50% Injectable 25 Gram(s) IV Push once  docusate sodium 100 milliGRAM(s) Oral two times a day  fenofibrate Tablet 145 milliGRAM(s) Oral daily  insulin lispro (HumaLOG) corrective regimen sliding scale   SubCutaneous three times a day before meals  metoprolol succinate  milliGRAM(s) Oral daily  pantoprazole    Tablet 40 milliGRAM(s) Oral before breakfast  polyethylene glycol 3350 17 Gram(s) Oral daily  rivaroxaban 15 milliGRAM(s) Oral every 24 hours  sodium chloride 0.9%. 1000 milliLiter(s) (100 mL/Hr) IV Continuous <Continuous>  sodium chloride 0.9%. 1000 milliLiter(s) (75 mL/Hr) IV Continuous <Continuous>    MEDICATIONS  (PRN):  ALPRAZolam 1 milliGRAM(s) Oral daily PRN anxiety  dextrose 40% Gel 15 Gram(s) Oral once PRN Blood Glucose LESS THAN 70 milliGRAM(s)/deciliter  glucagon  Injectable 1 milliGRAM(s) IntraMuscular once PRN Glucose LESS THAN 70 milligrams/deciliter  zolpidem 5 milliGRAM(s) Oral at bedtime PRN Insomnia  zolpidem 5 milliGRAM(s) Oral at bedtime PRN Insomnia      Allergies  IV Contrast (Anaphylaxis)      Review of Systems:  General:  No wt loss, fevers, chills, night sweats,fatigue,   CV:  No pain, palpitatioins, hypo/hypertension  Resp:  No dyspnea, cough, tachypnea, wheezing  :  No pain, bleeding, incontinence, nocturia  Muscle:  No pain, weakness  Neuro:  No weakness, tingling, memory problems  Psych:  No fatigue, insomnia, mood problems, depression  Endocrine:  No polyuria, polydypsia, cold/heat intolerance  Heme:  No petechiae, ecchymosis, easy bruisability  Skin:  No rash, tattoos, scars, edema    Vital Signs Last 24 Hrs  T(C): 36.7 (04 Jan 2019 06:00), Max: 37 (03 Jan 2019 19:05)  T(F): 98.1 (04 Jan 2019 06:00), Max: 98.6 (03 Jan 2019 19:05)  HR: 88 (04 Jan 2019 06:00) (70 - 89)  BP: 193/87 (04 Jan 2019 06:00) (146/69 - 208/87)  BP(mean): --  RR: 18 (04 Jan 2019 06:00) (16 - 18)  SpO2: 95% (04 Jan 2019 06:00) (94% - 99%)    PHYSICAL EXAM:  Constitutional: NAD, well-developed pleasant female, non toxic appearing sitting up at edge of bed  Neck: No LAD, supple  Respiratory: good air entry b/l  Cardiovascular: S1 and S2, RRR, +m  Gastrointestinal: BS+, obese soft, NT/ND, neg HSM,  Extremities: No peripheral edema, neg clubbing, cyanosis   Vascular: 2+ peripheral pulses  Neurological: A/O x 3, no focal deficits  Psychiatric: Normal mood, normal affect  Skin: No rashes    LABS:                        10.8   11.3  )-----------( 327      ( 03 Jan 2019 23:13 )             32.9     Hemoglobin: 11.1 g/dL <L> [11.5 - 15.5] (01-03 @ 14:55)  Hemoglobin: 10.3 g/dL <L> [11.5 - 15.5] (01-03 @ 05:06)  Hemoglobin: 11.6 g/dL [11.5 - 15.5] (01-01 @ 16:34)        01-04    146<H>  |  109<H>  |  30<H>  ----------------------------<  94  4.2   |  24  |  1.41<H>    Ca    9.0      04 Jan 2019 05:48  Mg     1.7     01-03      PT/INR - ( 03 Jan 2019 05:06 )   PT: 11.1 sec;   INR: 0.97 ratio         PTT - ( 03 Jan 2019 05:06 )  PTT:29.1 sec      RADIOLOGY & ADDITIONAL TESTS:

## 2019-01-04 NOTE — PROGRESS NOTE ADULT - SUBJECTIVE AND OBJECTIVE BOX
73F with pmh afib on xarelto, MI s/p stent in 1999  p/w with shortness of breath, nausea and palpitations. Now S/P  PCI of mLAD, VINAY X1      Allergies    IV Contrast (Anaphylaxis)    Intolerances        Medications:  ALPRAZolam 1 milliGRAM(s) Oral daily PRN  ALPRAZolam 0.5 milliGRAM(s) Oral once  aspirin enteric coated 81 milliGRAM(s) Oral daily  atorvastatin 40 milliGRAM(s) Oral at bedtime  clopidogrel Tablet 75 milliGRAM(s) Oral daily  dextrose 40% Gel 15 Gram(s) Oral once PRN  dextrose 5%. 1000 milliLiter(s) IV Continuous <Continuous>  dextrose 50% Injectable 12.5 Gram(s) IV Push once  dextrose 50% Injectable 25 Gram(s) IV Push once  dextrose 50% Injectable 25 Gram(s) IV Push once  docusate sodium 100 milliGRAM(s) Oral two times a day  fenofibrate Tablet 145 milliGRAM(s) Oral daily  glucagon  Injectable 1 milliGRAM(s) IntraMuscular once PRN  insulin lispro (HumaLOG) corrective regimen sliding scale   SubCutaneous three times a day before meals  metoprolol succinate  milliGRAM(s) Oral daily  pantoprazole    Tablet 40 milliGRAM(s) Oral before breakfast  polyethylene glycol 3350 17 Gram(s) Oral daily  rivaroxaban 15 milliGRAM(s) Oral every 24 hours  sodium chloride 0.9%. 1000 milliLiter(s) IV Continuous <Continuous>  sodium chloride 0.9%. 1000 milliLiter(s) IV Continuous <Continuous>  zolpidem 5 milliGRAM(s) Oral at bedtime PRN  zolpidem 5 milliGRAM(s) Oral at bedtime PRN      Vitals:  T(C): 37 (01-03-19 @ 19:05), Max: 37 (01-03-19 @ 19:05)  HR: 87 (01-03-19 @ 19:05) (70 - 89)  BP: 171/66 (01-03-19 @ 19:05) (135/74 - 208/87)  BP(mean): --  RR: 16 (01-03-19 @ 19:05) (16 - 18)  SpO2: 95% (01-03-19 @ 19:05) (94% - 99%)  Wt(kg): --  Daily     Daily   I&O's Summary    02 Jan 2019 07:01  -  03 Jan 2019 07:00  --------------------------------------------------------  IN: 1950 mL / OUT: 0 mL / NET: 1950 mL    03 Jan 2019 07:01  -  04 Jan 2019 01:07  --------------------------------------------------------  IN: 1115 mL / OUT: 0 mL / NET: 1115 mL          Physical Exam:  Appearance: Normal  Eyes: PERRL, EOMI  HENT: Normal oral muscosa, NC/AT  Cardiovascular: S1S2, RRR, No M/R/G, no JVD, No Lower extremity edema  Procedural Access Site: Right radial arterial site with no hematoma, tender to palpation, 2+ pulse, No bruit, (+) Ecchymosis but soft  Respiratory: Clear to auscultation bilaterally  Gastrointestinal: Soft, Non tender, Normal Bowel Sounds  Musculoskeletal: No clubbing, No joint deformity   Neurologic: Non-focal  Lymphatic: No lymphadenopathy  Psychiatry: AAOx3, Mood & affect appropriate  Skin: No rashes, No ecchymoses, No cyanosis    01-03    142  |  106  |  34<H>  ----------------------------<  248<H>  4.6   |  21<L>  |  1.64<H>    Ca    9.4      03 Jan 2019 23:13  Mg     1.7     01-03      PT/INR - ( 03 Jan 2019 05:06 )   PT: 11.1 sec;   INR: 0.97 ratio         PTT - ( 03 Jan 2019 05:06 )  PTT:29.1 sec        Lipid panel   Hgb A1c Hemoglobin A1C, Whole Blood: 5.9 % (01-03 @ 07:33)          ECG:    Echo:    Stress Testing:     Cath:    Imaging:    Interpretation of Telemetry:

## 2019-01-04 NOTE — PROGRESS NOTE ADULT - ASSESSMENT
73F pmh Breast CA, afib on xarelto, MI s/p stent in 1999  p/w 2 hours of squeezing chest pain associated with shortness of breath, nausea and palpitations.  Now s/p cath with PCI to LAD (VINAY)  Anemia - without overt/brisk GI bleed.   Recent extensive work-up with primary GI for +Cologard; EGD, CT Colonography, Colonoscopy, VCE and DBE  Hgb stable      PLAN  -Continue PPI  -Monitor CBC and BMs  -No GI objection to Anti-platelet Rx given PCI  -AC (Xarelto) as per Cardiology   -No plans for EGD or colonoscopy at this time.    Discussed with pt at bedside  Discharge planning per primary team  Can follow up with primary outpt GI    Aric Salcedo PA-C    [facility]  [phone]

## 2019-01-04 NOTE — PROGRESS NOTE ADULT - ASSESSMENT
73F pmh afib on xarelto, MI s/p stent in 1999, p/w with shortness of breath, nausea and palpitations. Pt states she was feeling so bad that she took an extra dose of her metoprolol prior to the ER.   Pt says her cardiologist was planning to do another angiogram in the near future once her BP was better controlled. Pt now S/P PCI of mLAD with VINAY X1(90%). Pt tolerated procedure well. Right radial artery site intact. Night remains uneventful. Post procedure discharged instructions discussed and questions addressed. Hospital course complicated bu elevated creat. IVF NS in progress. Will continue to monitor.

## 2019-01-07 RX ORDER — GLIMEPIRIDE 1 MG
1 TABLET ORAL
Qty: 0 | Refills: 0 | COMMUNITY

## 2019-01-07 RX ORDER — RIVAROXABAN 15 MG-20MG
1 KIT ORAL
Qty: 0 | Refills: 0 | COMMUNITY

## 2019-01-07 RX ORDER — ROSUVASTATIN CALCIUM 5 MG/1
1 TABLET ORAL
Qty: 0 | Refills: 0 | COMMUNITY

## 2019-01-07 RX ORDER — RABEPRAZOLE 20 MG/1
1 TABLET, DELAYED RELEASE ORAL
Qty: 0 | Refills: 0 | COMMUNITY

## 2019-01-07 RX ORDER — FENOFIBRATE,MICRONIZED 130 MG
1 CAPSULE ORAL
Qty: 0 | Refills: 0 | COMMUNITY

## 2019-01-07 RX ORDER — ALPRAZOLAM 0.25 MG
1 TABLET ORAL
Qty: 0 | Refills: 0 | COMMUNITY

## 2019-01-07 RX ORDER — ZOLPIDEM TARTRATE 10 MG/1
1 TABLET ORAL
Qty: 0 | Refills: 0 | COMMUNITY

## 2019-01-07 RX ORDER — OLMESARTAN MEDOXOMIL 5 MG/1
1 TABLET, FILM COATED ORAL
Qty: 0 | Refills: 0 | COMMUNITY

## 2019-01-07 RX ORDER — METOPROLOL TARTRATE 50 MG
1 TABLET ORAL
Qty: 0 | Refills: 0 | COMMUNITY

## 2019-01-07 RX ORDER — IBANDRONATE SODIUM 150 MG/1
1 TABLET ORAL
Qty: 0 | Refills: 0 | COMMUNITY

## 2019-01-07 RX ORDER — ASPIRIN/CALCIUM CARB/MAGNESIUM 324 MG
1 TABLET ORAL
Qty: 0 | Refills: 0 | COMMUNITY

## 2019-01-12 ENCOUNTER — EMERGENCY (EMERGENCY)
Facility: HOSPITAL | Age: 74
LOS: 1 days | End: 2019-01-12
Attending: EMERGENCY MEDICINE
Payer: COMMERCIAL

## 2019-01-12 VITALS
RESPIRATION RATE: 18 BRPM | OXYGEN SATURATION: 95 % | TEMPERATURE: 98 F | WEIGHT: 186.95 LBS | HEART RATE: 69 BPM | HEIGHT: 68 IN | DIASTOLIC BLOOD PRESSURE: 93 MMHG | SYSTOLIC BLOOD PRESSURE: 188 MMHG

## 2019-01-12 VITALS
OXYGEN SATURATION: 97 % | RESPIRATION RATE: 16 BRPM | SYSTOLIC BLOOD PRESSURE: 146 MMHG | TEMPERATURE: 98 F | HEART RATE: 75 BPM | DIASTOLIC BLOOD PRESSURE: 81 MMHG

## 2019-01-12 DIAGNOSIS — K46.9 UNSPECIFIED ABDOMINAL HERNIA WITHOUT OBSTRUCTION OR GANGRENE: Chronic | ICD-10-CM

## 2019-01-12 DIAGNOSIS — Z90.12 ACQUIRED ABSENCE OF LEFT BREAST AND NIPPLE: Chronic | ICD-10-CM

## 2019-01-12 DIAGNOSIS — K42.9 UMBILICAL HERNIA WITHOUT OBSTRUCTION OR GANGRENE: Chronic | ICD-10-CM

## 2019-01-12 DIAGNOSIS — Z98.89 OTHER SPECIFIED POSTPROCEDURAL STATES: Chronic | ICD-10-CM

## 2019-01-12 DIAGNOSIS — K40.90 UNILATERAL INGUINAL HERNIA, WITHOUT OBSTRUCTION OR GANGRENE, NOT SPECIFIED AS RECURRENT: Chronic | ICD-10-CM

## 2019-01-12 DIAGNOSIS — Z98.1 ARTHRODESIS STATUS: Chronic | ICD-10-CM

## 2019-01-12 DIAGNOSIS — I25.10 ATHEROSCLEROTIC HEART DISEASE OF NATIVE CORONARY ARTERY WITHOUT ANGINA PECTORIS: Chronic | ICD-10-CM

## 2019-01-12 DIAGNOSIS — C50.912 MALIGNANT NEOPLASM OF UNSPECIFIED SITE OF LEFT FEMALE BREAST: Chronic | ICD-10-CM

## 2019-01-12 DIAGNOSIS — C50.919 MALIGNANT NEOPLASM OF UNSPECIFIED SITE OF UNSPECIFIED FEMALE BREAST: Chronic | ICD-10-CM

## 2019-01-12 LAB
ALBUMIN SERPL ELPH-MCNC: 4.2 G/DL — SIGNIFICANT CHANGE UP (ref 3.3–5)
ALP SERPL-CCNC: 38 U/L — LOW (ref 40–120)
ALT FLD-CCNC: 18 U/L — SIGNIFICANT CHANGE UP (ref 10–45)
ANION GAP SERPL CALC-SCNC: 14 MMOL/L — SIGNIFICANT CHANGE UP (ref 5–17)
APPEARANCE UR: CLEAR — SIGNIFICANT CHANGE UP
AST SERPL-CCNC: 36 U/L — SIGNIFICANT CHANGE UP (ref 10–40)
BACTERIA # UR AUTO: NEGATIVE — SIGNIFICANT CHANGE UP
BASOPHILS # BLD AUTO: 0.1 K/UL — SIGNIFICANT CHANGE UP (ref 0–0.2)
BASOPHILS NFR BLD AUTO: 0.8 % — SIGNIFICANT CHANGE UP (ref 0–2)
BILIRUB SERPL-MCNC: 0.3 MG/DL — SIGNIFICANT CHANGE UP (ref 0.2–1.2)
BILIRUB UR-MCNC: NEGATIVE — SIGNIFICANT CHANGE UP
BUN SERPL-MCNC: 32 MG/DL — HIGH (ref 7–23)
CALCIUM SERPL-MCNC: 9.9 MG/DL — SIGNIFICANT CHANGE UP (ref 8.4–10.5)
CHLORIDE SERPL-SCNC: 105 MMOL/L — SIGNIFICANT CHANGE UP (ref 96–108)
CO2 SERPL-SCNC: 22 MMOL/L — SIGNIFICANT CHANGE UP (ref 22–31)
COLOR SPEC: SIGNIFICANT CHANGE UP
CREAT SERPL-MCNC: 1.26 MG/DL — SIGNIFICANT CHANGE UP (ref 0.5–1.3)
DIFF PNL FLD: NEGATIVE — SIGNIFICANT CHANGE UP
EOSINOPHIL # BLD AUTO: 0.3 K/UL — SIGNIFICANT CHANGE UP (ref 0–0.5)
EOSINOPHIL NFR BLD AUTO: 3.3 % — SIGNIFICANT CHANGE UP (ref 0–6)
EPI CELLS # UR: 0 /HPF — SIGNIFICANT CHANGE UP
GLUCOSE SERPL-MCNC: 154 MG/DL — HIGH (ref 70–99)
GLUCOSE UR QL: NEGATIVE — SIGNIFICANT CHANGE UP
HCT VFR BLD CALC: 35.1 % — SIGNIFICANT CHANGE UP (ref 34.5–45)
HGB BLD-MCNC: 11.7 G/DL — SIGNIFICANT CHANGE UP (ref 11.5–15.5)
HYALINE CASTS # UR AUTO: 0 /LPF — SIGNIFICANT CHANGE UP (ref 0–2)
KETONES UR-MCNC: NEGATIVE — SIGNIFICANT CHANGE UP
LEUKOCYTE ESTERASE UR-ACNC: NEGATIVE — SIGNIFICANT CHANGE UP
LIDOCAIN IGE QN: 50 U/L — SIGNIFICANT CHANGE UP (ref 7–60)
LYMPHOCYTES # BLD AUTO: 3.1 K/UL — SIGNIFICANT CHANGE UP (ref 1–3.3)
LYMPHOCYTES # BLD AUTO: 33.8 % — SIGNIFICANT CHANGE UP (ref 13–44)
MCHC RBC-ENTMCNC: 28.5 PG — SIGNIFICANT CHANGE UP (ref 27–34)
MCHC RBC-ENTMCNC: 33.3 GM/DL — SIGNIFICANT CHANGE UP (ref 32–36)
MCV RBC AUTO: 85.6 FL — SIGNIFICANT CHANGE UP (ref 80–100)
MONOCYTES # BLD AUTO: 0.9 K/UL — SIGNIFICANT CHANGE UP (ref 0–0.9)
MONOCYTES NFR BLD AUTO: 10.4 % — SIGNIFICANT CHANGE UP (ref 2–14)
NEUTROPHILS # BLD AUTO: 4.7 K/UL — SIGNIFICANT CHANGE UP (ref 1.8–7.4)
NEUTROPHILS NFR BLD AUTO: 51.7 % — SIGNIFICANT CHANGE UP (ref 43–77)
NITRITE UR-MCNC: NEGATIVE — SIGNIFICANT CHANGE UP
PH UR: 6.5 — SIGNIFICANT CHANGE UP (ref 5–8)
PLATELET # BLD AUTO: 373 K/UL — SIGNIFICANT CHANGE UP (ref 150–400)
POTASSIUM SERPL-MCNC: 5.7 MMOL/L — HIGH (ref 3.5–5.3)
POTASSIUM SERPL-SCNC: 5.7 MMOL/L — HIGH (ref 3.5–5.3)
PROT SERPL-MCNC: 7.5 G/DL — SIGNIFICANT CHANGE UP (ref 6–8.3)
PROT UR-MCNC: ABNORMAL
RBC # BLD: 4.1 M/UL — SIGNIFICANT CHANGE UP (ref 3.8–5.2)
RBC # FLD: 13.3 % — SIGNIFICANT CHANGE UP (ref 10.3–14.5)
RBC CASTS # UR COMP ASSIST: 6 /HPF — HIGH (ref 0–4)
SODIUM SERPL-SCNC: 141 MMOL/L — SIGNIFICANT CHANGE UP (ref 135–145)
SP GR SPEC: 1.01 — SIGNIFICANT CHANGE UP (ref 1.01–1.02)
UROBILINOGEN FLD QL: NEGATIVE — SIGNIFICANT CHANGE UP
WBC # BLD: 9 K/UL — SIGNIFICANT CHANGE UP (ref 3.8–10.5)
WBC # FLD AUTO: 9 K/UL — SIGNIFICANT CHANGE UP (ref 3.8–10.5)
WBC UR QL: 1 /HPF — SIGNIFICANT CHANGE UP (ref 0–5)

## 2019-01-12 PROCEDURE — 99284 EMERGENCY DEPT VISIT MOD MDM: CPT | Mod: 25

## 2019-01-12 PROCEDURE — 83690 ASSAY OF LIPASE: CPT

## 2019-01-12 PROCEDURE — 85027 COMPLETE CBC AUTOMATED: CPT

## 2019-01-12 PROCEDURE — 74174 CTA ABD&PLVS W/CONTRAST: CPT

## 2019-01-12 PROCEDURE — 74174 CTA ABD&PLVS W/CONTRAST: CPT | Mod: 26

## 2019-01-12 PROCEDURE — 80053 COMPREHEN METABOLIC PANEL: CPT

## 2019-01-12 PROCEDURE — 96375 TX/PRO/DX INJ NEW DRUG ADDON: CPT | Mod: XU

## 2019-01-12 PROCEDURE — 81001 URINALYSIS AUTO W/SCOPE: CPT

## 2019-01-12 PROCEDURE — 99284 EMERGENCY DEPT VISIT MOD MDM: CPT

## 2019-01-12 PROCEDURE — 96374 THER/PROPH/DIAG INJ IV PUSH: CPT | Mod: XU

## 2019-01-12 RX ORDER — HYDROCORTISONE 20 MG
200 TABLET ORAL ONCE
Qty: 0 | Refills: 0 | Status: COMPLETED | OUTPATIENT
Start: 2019-01-12 | End: 2019-01-12

## 2019-01-12 RX ORDER — DIPHENHYDRAMINE HCL 50 MG
50 CAPSULE ORAL ONCE
Qty: 0 | Refills: 0 | Status: DISCONTINUED | OUTPATIENT
Start: 2019-01-12 | End: 2019-01-12

## 2019-01-12 RX ORDER — DIAZEPAM 5 MG
5 TABLET ORAL ONCE
Qty: 0 | Refills: 0 | Status: DISCONTINUED | OUTPATIENT
Start: 2019-01-12 | End: 2019-01-12

## 2019-01-12 RX ORDER — DIPHENHYDRAMINE HCL 50 MG
50 CAPSULE ORAL ONCE
Qty: 0 | Refills: 0 | Status: COMPLETED | OUTPATIENT
Start: 2019-01-12 | End: 2019-01-12

## 2019-01-12 RX ADMIN — Medication 200 MILLIGRAM(S): at 10:41

## 2019-01-12 RX ADMIN — Medication 5 MILLIGRAM(S): at 11:07

## 2019-01-12 RX ADMIN — Medication 50 MILLIGRAM(S): at 13:53

## 2019-01-12 NOTE — ED PROVIDER NOTE - OBJECTIVE STATEMENT
72 y/o female PMH of A fib on Xarelto, MI w/ stent in 1999 and 1 more VINAY placed to mLAD for 90% stenosis 1 week ago, p/w abd pain. 74 y/o female PMH of A fib on Xarelto, MI w/ stent in 1999 and 1 more VINAY placed to mLAD for 90% stenosis 1 week ago (Per documentation), presents today with abd pain. Per patient, 72 y/o female PMH of A fib on Xarelto, MI w/ stent in 1999 and 1 more VINAY placed to mLAD for 90% stenosis 1 week ago (Per documentation), presents today with abd pain. Per patient, has had the pain since leaving the hospital. States pain in left upper quadrant w/o any changes in bowel habits, bloody stool, anorexia, chest pain, SOB, numbness, weakness. Saw PMD, concerned for an embolic event to gut from her angio. No rash, no fevers. Describes pain as burning, non radiating.

## 2019-01-12 NOTE — ED PROVIDER NOTE - CARE PLAN
Principal Discharge DX:	Abdominal pain, unspecified abdominal location  Goal:	L sided abdominal pain

## 2019-01-12 NOTE — ED ADULT NURSE NOTE - NSIMPLEMENTINTERV_GEN_ALL_ED
Implemented All Universal Safety Interventions:  [place] to call system. Call bell, personal items and telephone within reach. Instruct patient to call for assistance. Room bathroom lighting operational. Non-slip footwear when patient is off stretcher. Physically safe environment: no spills, clutter or unnecessary equipment. Stretcher in lowest position, wheels locked, appropriate side rails in place.

## 2019-01-12 NOTE — ED ADULT NURSE REASSESSMENT NOTE - NS ED NURSE REASSESS COMMENT FT1
Report taken from [place] MOODY in red. Pt sitting up in stretcher with  at bedside in NAD and VSS. Pt was premedicated for cat scan d/t her h/o anaphylaxis to IV contrast. As per Dr. Leavitt, pt is scheduled for CT at 1430. Pt updated on plan of care.

## 2019-01-12 NOTE — ED PROVIDER NOTE - MUSCULOSKELETAL, MLM
Strength appropriate for age. Full active range of motion of all 4 extremities. No joint swelling, calor, or deformities. Scattered ecchymoses over R arm from prior IV sites.

## 2019-01-12 NOTE — ED PROVIDER NOTE - PROGRESS NOTE DETAILS
Sandro Leavitt (Resident): patient reportedly has anaphylaxis to IV contrast 25 years ago w/ throat swelling - patient was premedicated for her angiogram, but per rads resident, is a different contrast - states patient would need an MRA of abdomen as will not do the CT if hx of anaphylaxis Sandro Leavitt (Resident): patient reportedly has anaphylaxis to IV contrast 25 years ago w/ throat swelling - patient was premedicated for her angiogram, but per rads resident, is a different contrast - states patient would need an MRA of abdomen as will not do the CT if hx of anaphylaxis - patient had an CTA recently, okay to do per rads w/ premedication Attending MD Alatorre.  Pt signed out to me in stable condition pending CT Read >Dispo, has L sided abdominal pain s/p card cath 1 wk ago, pt on eliquis, sent in for concern for embolized plaque as etiology of pain, pt well appearing.  Plan that if negative can go home. Sandro Leavitt (Resident): patient tolerating PO - d/w her unknown reason for her pain but nothing dangerous per CT - patient toleratijng PO - given copy of report- safe to d/c home Attending MD Alatorre.  CT results non-actionable.  Pt tolerating PO with minimal residual discomfort.  Stable for discharge. FOllow-up with outpt provider.  Return to ED for any acutely new/worsening sxs.  Pt verbalizes understanding of above return precautions and follow-up plan.

## 2019-01-12 NOTE — ED PROVIDER NOTE - MEDICAL DECISION MAKING DETAILS
Sandro Leavitt (Resident): 72 y/o recent angio w/ VINAY placed p/w abd pain for 1 week, constant, non radiating - low suspicion for embolic event given no changes in bowel habits - will CTA and dispo accordingly.

## 2019-01-12 NOTE — ED ADULT NURSE NOTE - OBJECTIVE STATEMENT
72 y/o F, reported to ED from home. A&ox3, c/o abd pain x1 week. Pt reports that her pain started shortly after her angioplasty last week. Pt's abd pain is in her LUQ. Pt reports feeling that her abdomen is more bloated than normal. Abd soft to palpation. Pt denies N/V/D. Pt denies any black or bloody stools. Pt denies any urinary symptoms. Pt followed up with her PMD, Stu yesterday and was told to come to the ED to r/o hernia or clot in the abdomen. Pt denies LOC, SOB, C/P, N/V/D, fever or chills. Pt denies taking anything for the pain prior to arrival to ED. Pt denies any smoking hx. Pt's fiance at bedside, will continue to monitor pt.

## 2019-01-12 NOTE — ED ADULT TRIAGE NOTE - CHIEF COMPLAINT QUOTE
left middle abdominal pain for a week, s/p Angioplasty last Thursday, sent by PCP to r/o blood clots, on Plavix and Xarelto

## 2019-01-12 NOTE — ED PROVIDER NOTE - NSFOLLOWUPINSTRUCTIONS_ED_ALL_ED_FT
1) Please return to the ED should you have any new or worsening symptoms, worsening pain, develop any concerning symptoms, chest pain  2) Please follow up with your primary care doctor in 2-3 days.

## 2019-01-12 NOTE — ED PROVIDER NOTE - ATTENDING CONTRIBUTION TO CARE
abdominal pain.  mild l periumb ttp, no rebound / guarding ?hernia  cta given recent cath. pt prob not actually all to contrast, will premedicated out of an abundance of caution. labs.

## 2019-01-12 NOTE — ED ADULT NURSE NOTE - CAS ELECT INFOMATION PROVIDED
DC instructions/f/u with pcp return for new or worsening symptoms including chest pain, sob. take the xarelto that was skipped this morning

## 2019-01-12 NOTE — ED PROVIDER NOTE - PMH
Anxiety    Atrial fibrillation    Atrial fibrillation    Breast cancer  left breast cancer diagnose din 1986 -- had lumpectomy with post op chemo and RT  Breast cancer  s/p left lumpectomy and mastectomy  CAD (coronary artery disease)    CAD (coronary artery disease)    CAD (coronary artery disease)  s/p 1 stent (1999)  Diabetes  type II diagnosed in 2003 -- doesn't do finger sticks  Diabetes mellitus  type 2  Elevated WBC count  h/o having elevated WBCs -- as high as 13,000  HLD (hyperlipidemia)    HTN (hypertension)    Hypercholesterolemia    Hypertension    Lumbar spinal stenosis  November 2014  MI (myocardial infarction)  1999, then had cardiac stent inserted  Myocardial infarct    Obesity    Osteoporosis    Recurrent breast cancer, left  diagnosed in 1991 -- had surgery and no post op chemo or RT  Reflux    Sciatica neuralgia, left  in 2014 due to spinal stenosis

## 2019-01-12 NOTE — ED ADULT NURSE REASSESSMENT NOTE - NS ED NURSE REASSESS COMMENT FT1
Pt was premedicated for cat scan scheduled for 1430. Call placed to cat scan at 1506 - [facility] states transport will come and bring pt to cat scan now.

## 2019-05-30 ENCOUNTER — RESULT REVIEW (OUTPATIENT)
Age: 74
End: 2019-05-30

## 2019-07-26 ENCOUNTER — NON-APPOINTMENT (OUTPATIENT)
Age: 74
End: 2019-07-26

## 2019-07-26 ENCOUNTER — APPOINTMENT (OUTPATIENT)
Dept: OPHTHALMOLOGY | Facility: CLINIC | Age: 74
End: 2019-07-26
Payer: COMMERCIAL

## 2019-07-26 PROCEDURE — 92015 DETERMINE REFRACTIVE STATE: CPT

## 2019-07-26 PROCEDURE — 92004 COMPRE OPH EXAM NEW PT 1/>: CPT

## 2019-10-22 NOTE — PATIENT PROFILE ADULT - OVER THE PAST TWO WEEKS, HAVE YOU FELT LITTLE INTEREST OR PLEASURE IN DOING THINGS?
Product 63 Amount/Unit (Numbers Only): 0 Product 4 Application Directions: Wipe to face BID after washing Product 7 Application Directions: Apply to face QHS. Start off 2x week Product 73 Unit Type: mg Detail Level: Zone Product 4 Unit Type: bottle(s) Product 2 Refills: 6 Product 7 Unit Type: tube(s) Product 2 Amount/Unit (Numbers Only): 1 Send Charges To Patient Encounter: No Name Of Product 5: Tretinoin 0.05% cream Name Of Product 8: Skinceuticals LHA cleanser Product 8 Application Directions: Wash to face BID Render Refills If Set To 0: Yes Product 3 Application Directions: Apply to face BID Product 6 Application Directions: Apply to face QAM Name Of Product 7: Tretinoin 0.025% cream Name Of Product 4: Skinceuticals Toner Product 9 Unit Type: jar(s) Name Of Product 10: Elta MD UV Elements Product 5 Application Directions: Apply to face QHS Name Of Product 6: Avene A-Oxitive Antioxidant water cream Name Of Product 3: Skinceuticals HA intensifier Name Of Product 1: Smiley Name Of Product 9: Skinceuticals Triple Lipid Repair Product 1 Application Directions: Apply to upper eyelid QHS Name Of Product 2: ZO Melamix no

## 2021-10-26 ENCOUNTER — NON-APPOINTMENT (OUTPATIENT)
Age: 76
End: 2021-10-26

## 2021-10-27 ENCOUNTER — APPOINTMENT (OUTPATIENT)
Dept: ORTHOPEDIC SURGERY | Facility: CLINIC | Age: 76
End: 2021-10-27
Payer: COMMERCIAL

## 2021-10-27 ENCOUNTER — NON-APPOINTMENT (OUTPATIENT)
Age: 76
End: 2021-10-27

## 2021-10-27 VITALS — BODY MASS INDEX: 29.1 KG/M2 | HEIGHT: 68 IN | WEIGHT: 192 LBS

## 2021-10-27 PROCEDURE — 99204 OFFICE O/P NEW MOD 45 MIN: CPT

## 2021-10-27 PROCEDURE — 73564 X-RAY EXAM KNEE 4 OR MORE: CPT | Mod: LT

## 2021-10-28 ENCOUNTER — TRANSCRIPTION ENCOUNTER (OUTPATIENT)
Age: 76
End: 2021-10-28

## 2021-11-03 ENCOUNTER — APPOINTMENT (OUTPATIENT)
Dept: ORTHOPEDIC SURGERY | Facility: CLINIC | Age: 76
End: 2021-11-03

## 2021-11-04 ENCOUNTER — APPOINTMENT (OUTPATIENT)
Dept: ORTHOPEDIC SURGERY | Facility: CLINIC | Age: 76
End: 2021-11-04
Payer: COMMERCIAL

## 2021-11-04 VITALS
SYSTOLIC BLOOD PRESSURE: 154 MMHG | WEIGHT: 192 LBS | DIASTOLIC BLOOD PRESSURE: 76 MMHG | OXYGEN SATURATION: 98 % | HEART RATE: 74 BPM | BODY MASS INDEX: 29.1 KG/M2 | HEIGHT: 68 IN

## 2021-11-04 PROCEDURE — 99214 OFFICE O/P EST MOD 30 MIN: CPT | Mod: 25

## 2021-11-04 PROCEDURE — 20611 DRAIN/INJ JOINT/BURSA W/US: CPT | Mod: LT

## 2021-11-04 NOTE — HISTORY OF PRESENT ILLNESS
[de-identified] : This is a 76-year-old female 5 years ago developed spontaneous onset of chronic intermittent pain left knee.  4 days ago patient reports increasing symptom severity pain over the anteromedial aspect provoked by going up stairs inconsistently walking.  Denies swelling or locking.  Reports having undergone steroid injection left knee 15 years ago.  Ambulates independently

## 2021-11-04 NOTE — PHYSICAL EXAM
[de-identified] : Constitutional:Well nourished , well developed and in no acute distress\par Psychiatric: Alert and oriented to time place and person.Appropriate affect \par Skin:Head, neck, arms and lower extremities:no lesions or discoloration\par HEENT: Normocephalic, EOM intact, Nasal septum midline,\par Respiratory: Unlabored respirations,no audible wheezing ,no tachypnea, no cyanosis\par Cardiovascular: no leg swelling  no ankle edema no JVD, pulse regular\par Vascular: no calf or thigh tenderness, \par Peripheral pulses; intact\par Lymphatics:No groin adenopathy,no lymphedema lower  or upper extremities\par Left knee no effusion flexion 10/115 ligaments intact dorsal pedal pulse 1+ [de-identified] : X-ray leftt knee 4 views weightbearing demonstrates lateral compartment degenerative narrowing bone-on-bone contact subchondral sclerosis

## 2021-11-04 NOTE — HISTORY OF PRESENT ILLNESS
[de-identified] : Ms. VALERIA CHO 76 year F  who presents with left knee pain.  Pain is primarily located at the anteromedial knee.  It has been ongoing for a few years, she had a knee injection done 15 years ago (does not recall what kind of injection it was). She denies injury, surgery or trauma to the left knee.  Pain is described as sharp and achy in nature, 10/10 in intensity, worse with walking and standing  better with rest. No bruising or swelling noted. Her knees do lock into place at times and her left knee does give out on her as well. Denies bowel/bladder changes, fevers, chills, saddle anesthesia.  Denies numbness, tingling, weakness of the lower extremities. She is here today for a cortisone injection to her left knee. \par \par \par

## 2021-11-04 NOTE — DISCUSSION/SUMMARY
[de-identified] : I discussed the treatment of degenerative arthritis with the patient at length today. I described the spectrum of treatment from nonoperative modalities to total joint arthroplasty. Noninvasive and nonoperative treatment modalities include weight reduction, activity modification with low impact exercise, PRN use of acetaminophen or anti-inflammatory medication if tolerated, glucosamine/chondroitin supplements, and physical therapy. Further treatments can include corticosteroid injection and the use of hyaluronic acid injections. Definitive treatment can certainly include total joint arthroplasty, but patient would require surgical consultation to discuss that option further. The risks and benefits of each treatment options was discussed and all questions were answered. Ultrasound guided aspiration and cortisone injection provided today. Follow up in 3 months for repeat injection.\par \par Caitlin Sanchez MD, EdM\par Sports Medicine [facility]\par Department of Orthopedics \par

## 2021-11-04 NOTE — PROCEDURE
[de-identified] : Ultrasound Guided Injection \par Indication for ultrasound guidance: Ensure placement within the knee joint\par \par Utlizing the [employer] portable ultrasound machine, the Linear 6cm 13-6 MHz transducer and sterile ultrasound gel, ultrasound guidance with the probe in the short axis, utilizing an in plane approach, was used for the following injection:\par \par \par Aspiration: left knee joint.\par Indication: Effusion and arthritis\par \par A discussion was had with the patient regarding this procedure and all questions were answered. All risks, benefits and alternatives were discussed. These included but were not limited to bleeding, infection, allergic reaction and reaccumulation of fluid. A timeout was done to ensure correct side and pt agreed to the procedure.  Chlorhexidine was used to sterilize and prep the area in the supero-lateral aspect of the knee. .A 25-gauge needle was used to injection 3mL of 1% lidocaine without epinephrine.  An 18-gauge needle was then used to aspirate the knee joint and approximately *** cc of yellow fluid was aspirated from the knee without complication, the same needle was used to inject 4cc of 1% lidocaine without epinephrine and 1cc of 40mg/ml methylprednisolone into the knee. A sterile bandage was then applied. The patient tolerated the procedure well.\par

## 2021-11-04 NOTE — PHYSICAL EXAM
[de-identified] : Constitutional: Well-nourished, well-developed, No acute distress\par Respiratory:  Good respiratory effort, no SOB\par Lymphatic: No regional lymphadenopathy, no lymphedema\par Psychiatric: Pleasant and normal affect, alert and oriented x3\par Skin: Clean dry and intact B/L UE/LE\par Musculoskeletal: normal except where as noted in regional exam\par \par left  Knee:\par APPEARANCE: + effusion\par POSITIVE TENDERNESS:  medial joint line. \par ROM: full & pain with flexion\par SPECIAL TESTS: stable v/v stress. painless grind. neg Lachman's. neg ant/post drawer. pos Heraclio's. \par NEURO: Normal sensation of LE, DTRs 2+/4 patella and achilles\par PULSES: 2+ DP/PT pulses\par B/L Hips: No asymmetry, malalignment, or swelling, Full ROM, 5/5 strength in flexion/ext, IR/ER, Abd/Add, Joints stable\par B/L Ankles: No asymmetry, malalignment, or swelling, Full ROM, 5/5 strength in DF/PF/Inv/Ev, Joints stable

## 2022-03-02 ENCOUNTER — EMERGENCY (EMERGENCY)
Facility: HOSPITAL | Age: 77
LOS: 1 days | Discharge: HOME | End: 2022-03-02
Attending: EMERGENCY MEDICINE | Admitting: EMERGENCY MEDICINE
Payer: COMMERCIAL

## 2022-03-02 VITALS
HEART RATE: 72 BPM | SYSTOLIC BLOOD PRESSURE: 168 MMHG | TEMPERATURE: 98 F | OXYGEN SATURATION: 100 % | HEIGHT: 68 IN | RESPIRATION RATE: 16 BRPM | DIASTOLIC BLOOD PRESSURE: 83 MMHG

## 2022-03-02 VITALS
OXYGEN SATURATION: 100 % | RESPIRATION RATE: 17 BRPM | DIASTOLIC BLOOD PRESSURE: 73 MMHG | TEMPERATURE: 98 F | SYSTOLIC BLOOD PRESSURE: 163 MMHG | HEART RATE: 70 BPM

## 2022-03-02 DIAGNOSIS — Z98.89 OTHER SPECIFIED POSTPROCEDURAL STATES: Chronic | ICD-10-CM

## 2022-03-02 DIAGNOSIS — Z98.1 ARTHRODESIS STATUS: Chronic | ICD-10-CM

## 2022-03-02 DIAGNOSIS — K40.90 UNILATERAL INGUINAL HERNIA, WITHOUT OBSTRUCTION OR GANGRENE, NOT SPECIFIED AS RECURRENT: Chronic | ICD-10-CM

## 2022-03-02 DIAGNOSIS — C50.912 MALIGNANT NEOPLASM OF UNSPECIFIED SITE OF LEFT FEMALE BREAST: Chronic | ICD-10-CM

## 2022-03-02 DIAGNOSIS — I25.10 ATHEROSCLEROTIC HEART DISEASE OF NATIVE CORONARY ARTERY WITHOUT ANGINA PECTORIS: Chronic | ICD-10-CM

## 2022-03-02 DIAGNOSIS — K46.9 UNSPECIFIED ABDOMINAL HERNIA WITHOUT OBSTRUCTION OR GANGRENE: Chronic | ICD-10-CM

## 2022-03-02 DIAGNOSIS — C50.919 MALIGNANT NEOPLASM OF UNSPECIFIED SITE OF UNSPECIFIED FEMALE BREAST: Chronic | ICD-10-CM

## 2022-03-02 DIAGNOSIS — K42.9 UMBILICAL HERNIA WITHOUT OBSTRUCTION OR GANGRENE: Chronic | ICD-10-CM

## 2022-03-02 DIAGNOSIS — Z90.12 ACQUIRED ABSENCE OF LEFT BREAST AND NIPPLE: Chronic | ICD-10-CM

## 2022-03-02 LAB
ALBUMIN SERPL ELPH-MCNC: 3.3 G/DL — SIGNIFICANT CHANGE UP (ref 3.3–5)
ALP SERPL-CCNC: 36 U/L — LOW (ref 40–120)
ALT FLD-CCNC: 19 U/L — SIGNIFICANT CHANGE UP (ref 4–33)
ANION GAP SERPL CALC-SCNC: 13 MMOL/L — SIGNIFICANT CHANGE UP (ref 7–14)
APTT BLD: 29.3 SEC — SIGNIFICANT CHANGE UP (ref 27–36.3)
AST SERPL-CCNC: 69 U/L — HIGH (ref 4–32)
BASOPHILS # BLD AUTO: 0.16 K/UL — SIGNIFICANT CHANGE UP (ref 0–0.2)
BASOPHILS NFR BLD AUTO: 1.8 % — SIGNIFICANT CHANGE UP (ref 0–2)
BILIRUB SERPL-MCNC: 0.2 MG/DL — SIGNIFICANT CHANGE UP (ref 0.2–1.2)
BLD GP AB SCN SERPL QL: NEGATIVE — SIGNIFICANT CHANGE UP
BUN SERPL-MCNC: 46 MG/DL — HIGH (ref 7–23)
CALCIUM SERPL-MCNC: 8.9 MG/DL — SIGNIFICANT CHANGE UP (ref 8.4–10.5)
CHLORIDE SERPL-SCNC: 107 MMOL/L — SIGNIFICANT CHANGE UP (ref 98–107)
CO2 SERPL-SCNC: 17 MMOL/L — LOW (ref 22–31)
CREAT SERPL-MCNC: 1.99 MG/DL — HIGH (ref 0.5–1.3)
EGFR: 25 ML/MIN/1.73M2 — LOW
EOSINOPHIL # BLD AUTO: 0.19 K/UL — SIGNIFICANT CHANGE UP (ref 0–0.5)
EOSINOPHIL NFR BLD AUTO: 2.2 % — SIGNIFICANT CHANGE UP (ref 0–6)
FLUAV AG NPH QL: SIGNIFICANT CHANGE UP
FLUBV AG NPH QL: SIGNIFICANT CHANGE UP
GLUCOSE SERPL-MCNC: 116 MG/DL — HIGH (ref 70–99)
HCT VFR BLD CALC: 27.3 % — LOW (ref 34.5–45)
HCT VFR BLD CALC: 28.6 % — LOW (ref 34.5–45)
HGB BLD-MCNC: 7.7 G/DL — LOW (ref 11.5–15.5)
HGB BLD-MCNC: 8.3 G/DL — LOW (ref 11.5–15.5)
IANC: 4.5 K/UL — SIGNIFICANT CHANGE UP (ref 1.5–8.5)
IMM GRANULOCYTES NFR BLD AUTO: 0.2 % — SIGNIFICANT CHANGE UP (ref 0–1.5)
INR BLD: 1.74 RATIO — HIGH (ref 0.88–1.16)
LYMPHOCYTES # BLD AUTO: 2.96 K/UL — SIGNIFICANT CHANGE UP (ref 1–3.3)
LYMPHOCYTES # BLD AUTO: 34.1 % — SIGNIFICANT CHANGE UP (ref 13–44)
MCHC RBC-ENTMCNC: 22.4 PG — LOW (ref 27–34)
MCHC RBC-ENTMCNC: 23.1 PG — LOW (ref 27–34)
MCHC RBC-ENTMCNC: 28.2 GM/DL — LOW (ref 32–36)
MCHC RBC-ENTMCNC: 29 GM/DL — LOW (ref 32–36)
MCV RBC AUTO: 79.4 FL — LOW (ref 80–100)
MCV RBC AUTO: 79.4 FL — LOW (ref 80–100)
MONOCYTES # BLD AUTO: 0.86 K/UL — SIGNIFICANT CHANGE UP (ref 0–0.9)
MONOCYTES NFR BLD AUTO: 9.9 % — SIGNIFICANT CHANGE UP (ref 2–14)
NEUTROPHILS # BLD AUTO: 4.5 K/UL — SIGNIFICANT CHANGE UP (ref 1.8–7.4)
NEUTROPHILS NFR BLD AUTO: 51.8 % — SIGNIFICANT CHANGE UP (ref 43–77)
NRBC # BLD: 2 /100 WBCS — SIGNIFICANT CHANGE UP
NRBC # BLD: 2 /100 WBCS — SIGNIFICANT CHANGE UP
NRBC # FLD: 0.13 K/UL — HIGH
NRBC # FLD: 0.14 K/UL — HIGH
OB PNL STL: NEGATIVE — SIGNIFICANT CHANGE UP
PLATELET # BLD AUTO: 447 K/UL — HIGH (ref 150–400)
PLATELET # BLD AUTO: 453 K/UL — HIGH (ref 150–400)
POTASSIUM SERPL-MCNC: SIGNIFICANT CHANGE UP MMOL/L (ref 3.5–5.3)
POTASSIUM SERPL-SCNC: SIGNIFICANT CHANGE UP MMOL/L (ref 3.5–5.3)
PROT SERPL-MCNC: 7 G/DL — SIGNIFICANT CHANGE UP (ref 6–8.3)
PROTHROM AB SERPL-ACNC: 20.3 SEC — HIGH (ref 10.5–13.4)
RBC # BLD: 3.44 M/UL — LOW (ref 3.8–5.2)
RBC # BLD: 3.6 M/UL — LOW (ref 3.8–5.2)
RBC # FLD: 17.7 % — HIGH (ref 10.3–14.5)
RBC # FLD: 18.5 % — HIGH (ref 10.3–14.5)
RH IG SCN BLD-IMP: POSITIVE — SIGNIFICANT CHANGE UP
RSV RNA NPH QL NAA+NON-PROBE: SIGNIFICANT CHANGE UP
SARS-COV-2 RNA SPEC QL NAA+PROBE: SIGNIFICANT CHANGE UP
SODIUM SERPL-SCNC: 137 MMOL/L — SIGNIFICANT CHANGE UP (ref 135–145)
WBC # BLD: 8.69 K/UL — SIGNIFICANT CHANGE UP (ref 3.8–10.5)
WBC # BLD: 9.1 K/UL — SIGNIFICANT CHANGE UP (ref 3.8–10.5)
WBC # FLD AUTO: 8.69 K/UL — SIGNIFICANT CHANGE UP (ref 3.8–10.5)
WBC # FLD AUTO: 9.1 K/UL — SIGNIFICANT CHANGE UP (ref 3.8–10.5)

## 2022-03-02 PROCEDURE — 99291 CRITICAL CARE FIRST HOUR: CPT | Mod: 25

## 2022-03-02 PROCEDURE — 93010 ELECTROCARDIOGRAM REPORT: CPT

## 2022-03-02 PROCEDURE — 76937 US GUIDE VASCULAR ACCESS: CPT | Mod: 26

## 2022-03-02 PROCEDURE — 71046 X-RAY EXAM CHEST 2 VIEWS: CPT | Mod: 26

## 2022-03-02 NOTE — ED CDU PROVIDER DISPOSITION NOTE - NSFOLLOWUPINSTRUCTIONS_ED_ALL_ED_FT
Rest, drink plenty of fluids.  Advance activity as tolerated.  Continue all previously prescribed medications as directed.  Follow up with your primary care physician in 48-72 hours- bring copies of your results.  Return to the ER for worsening or persistent symptoms, and/or ANY NEW OR CONCERNING SYMPTOMS. If you have issues obtaining follow up, please call: [phone] to obtain a doctor or specialist who takes your insurance in your area.

## 2022-03-02 NOTE — ED CDU PROVIDER DISPOSITION NOTE - PATIENT PORTAL LINK FT
You can access the FollowMyHealth Patient Portal offered by [facility] by registering at the following website: [email or web address]. By joining [employer]’s FollowMyHealth portal, you will also be able to view your health information using other applications (apps) compatible with our system.

## 2022-03-02 NOTE — ED CDU PROVIDER INITIAL DAY NOTE - OBJECTIVE STATEMENT
Patient is a 77 yo F with history of afib on xarelto, hx of CAD with stent, HTN, hyperlipidemia, type 2 DM, hx of breast CA s/p lumpectomy, anxiety, hemorrhoids sent in for symptomatic anemia, outpatient labs showing hemoglobin of 7.4. Patient reports fatigue, shortness of breath for months. Denies melena or BRBPR. No vaginal bleeding. No fevers, chills, nausea, vomiting. No chest pain. No lower leg swelling. Last colonoscopy 3 years ago. She has known polyps. Denies weight loss, night sweats.     In the ED, patient had blood work done that showed a hemoglobin of 7.7. Chest xray showed: Small left pleural effusion and/or left basilar atelectasis.    Patient in CDU for tele monitoring, pRBC transfusion, repeat labs.

## 2022-03-02 NOTE — ED PROVIDER NOTE - NSICDXFAMILYHX_GEN_ALL_CORE_FT
FAMILY HISTORY:  Father  Still living? No  Family history of emphysema, Age at diagnosis: Age Unknown    Mother  Still living? No  Family history of breast cancer, Age at diagnosis: Age Unknown

## 2022-03-02 NOTE — ED CDU PROVIDER DISPOSITION NOTE - CLINICAL COURSE
77 yo F with history of afib on xarelto, hx of CAD with stent, HTN, hyperlipidemia, type 2 DM, hx of breast CA s/p lumpectomy, anxiety, hemorrhoids sent in for symptomatic anemia, outpatient labs showing hemoglobin of 7.4. Patient reports fatigue, shortness of breath for months. Denies melena or BRBPR. No vaginal bleeding. No fevers, chills, nausea, vomiting. No chest pain. No lower leg swelling. Last colonoscopy 3 years ago. She has known polyps. Denies weight loss, night sweats. In the ED, patient had blood work done that showed a hemoglobin of 7.7. Chest xray showed: Small left pleural effusion and/or left basilar atelectasis. Patient in CDU for tele monitoring, pRBC transfusion, repeat labs. Pt received 1unit pRBC. Pt states symptoms improved. Repeat Hb sent. Pt is stable for discharge to f/u w/ PCP.

## 2022-03-02 NOTE — ED CDU PROVIDER INITIAL DAY NOTE - ATTENDING CONTRIBUTION TO CARE
Patient is a 75 yo F with history of afib on xarelto, hx of CAD with stent, HTN, hyperlipidemia, type 2 DM, hx of breast CA s/p lumpectomy, anxiety, hemorrhoids sent in for symptomatic anemia, outpatient labs showing hemoglobin of 7.4. Patient reports fatigue, shortness of breath for months. Denies melena or BRBPR. No vaginal bleeding. No fevers, chills, nausea, vomiting. No chest pain. No lower leg swelling. Last colonoscopy 3 years ago. She has known polyps. Denies weight loss, night sweats.     VS noted  Gen. no acute distress, Non toxic   HEENT: EOMI, mmm  Lungs: CTAB/L no C/ W /R   CVS: RRR   Abd; Soft non tender, non distended, rectal: done by OSCAR Anderson, brown stool  Ext: no edema  Skin: no rash  Neuro AAOx3 non focal clear speech  a/p: anemia - pt denies any bleeding. Will check labs and transfuse as necessary. CDU for monitoring, transfusion, repeat labs. Patient will follow up outpatient.   - Johnnie WYATT.

## 2022-03-02 NOTE — ED ADULT NURSE NOTE - NSIMPLEMENTINTERV_GEN_ALL_ED
Implemented All Universal Safety Interventions:  [place] to call system. Call bell, personal items and telephone within reach. Instruct patient to call for assistance. Room bathroom lighting operational. Non-slip footwear when patient is off stretcher. Physically safe environment: no spills, clutter or unnecessary equipment. Stretcher in lowest position, wheels locked, appropriate side rails in place.

## 2022-03-02 NOTE — ED PROVIDER NOTE - PROGRESS NOTE DETAILS
OSCAR Menjivar- pt does not want to be admitted. ordered 1prbc unit. pt denies hx of CHF, not on water pills/lasix Johnnie WYATT: Patient to go to CDU for transfusion and monitoring, repeat labs.

## 2022-03-02 NOTE — ED PROVIDER NOTE - CLINICAL SUMMARY MEDICAL DECISION MAKING FREE TEXT BOX
75 y/o female with pmhx of Afib on Xarelto, MI with stent, HTN, HLD, DM Type 2, breast ca s/p lumpectomy, anxiety, hemorrhoids, presents to ED with low hg 7.4 outpatient. Pt states she saw her PMD for fatigue, lightheadedness, weakness and SOB x few months. Pt denies chest pain, cough, fevers, abd pain, n/v/d. No melena or bloody stool. pt with brown stool on exam. plan to check labs, occult, cxr. blood transfusion

## 2022-03-02 NOTE — ED PROVIDER NOTE - OBJECTIVE STATEMENT
75 y/o female with pmhx of Afib on Xarelto, MI with stent, HTN, HLD, DM Type 2, breast ca s/p lumpectomy, anxiety, hemorrhoids, presents to ED with low hg 7.4 outpatient. Pt states she saw her PMD for fatigue, lightheadedness, weakness and SOB x few months. Pt denies chest pain, cough, fevers, abd pain, n/v/d. No melena or bloody stool.

## 2022-03-02 NOTE — ED PROVIDER NOTE - ATTENDING CONTRIBUTION TO CARE
Patient is a 75 yo F with history of afib on xarelto, hx of CAD with stent, HTN, hyperlipidemia, type 2 DM, hx of breast CA s/p lumpectomy, anxiety, hemorrhoids sent in for symptomatic anemia, outpatient labs showing hemoglobin of 7.4. Patient reports fatigue, shortness of breath for months. Denies melena or BRBPR. No vaginal bleeding. No fevers, chills, nausea, vomiting. No chest pain. No lower leg swelling. Last colonoscopy 3 years ago. She has known polyps. Denies weight loss, night sweats.     VS noted  Gen. no acute distress, Non toxic   HEENT: EOMI, mmm  Lungs: CTAB/L no C/ W /R   CVS: RRR   Abd; Soft non tender, non distended, rectal: done by OSCAR Anderson, brown stool  Ext: no edema  Skin: no rash  Neuro AAOx3 non focal clear speech  a/p: anemia - pt denies any bleeding. Will check labs and transfuse as necessary. Patient prefers outpatient follow up.   - Johnnie WYATT

## 2022-03-02 NOTE — ED PROVIDER NOTE - CRITICAL CARE ATTENDING CONTRIBUTION TO CARE
I personally saw the patient with the PA and provided a substantive portion of the care and the majority of the critical care time.    Patient is a 75 yo F with history of afib on xarelto, hx of CAD with stent, HTN, hyperlipidemia, type 2 DM, hx of breast CA s/p lumpectomy, anxiety, hemorrhoids sent in for symptomatic anemia, outpatient labs showing hemoglobin of 7.4. Patient reports fatigue, shortness of breath for months. Denies melena or BRBPR. No vaginal bleeding. No fevers, chills, nausea, vomiting. No chest pain. No lower leg swelling. Last colonoscopy 3 years ago. She has known polyps. Denies weight loss, night sweats.     VS noted  Gen. no acute distress, Non toxic   HEENT: EOMI, mmm  Lungs: CTAB/L no C/ W /R   CVS: RRR   Abd; Soft non tender, non distended, rectal: done by OSCAR Anderson, brown stool  Ext: no edema  Skin: no rash  Neuro AAOx3 non focal clear speech  a/p: anemia - pt denies any bleeding. Will check labs and transfuse as necessary. CDU for monitoring, transfusion, repeat labs. Patient will follow up outpatient.   - Johnnie WYATT

## 2022-03-02 NOTE — ED PROVIDER NOTE - NSICDXPASTMEDICALHX_GEN_ALL_CORE_FT
PAST MEDICAL HISTORY:  Anxiety     Atrial fibrillation     Atrial fibrillation     Breast cancer left breast cancer diagnose din 1986 -- had lumpectomy with post op chemo and RT    Breast cancer s/p left lumpectomy and mastectomy    CAD (coronary artery disease)     CAD (coronary artery disease)     CAD (coronary artery disease) s/p 1 stent (1999)    Diabetes type II diagnosed in 2003 -- doesn't do finger sticks    Diabetes mellitus type 2    Elevated WBC count h/o having elevated WBCs -- as high as 13,000    HLD (hyperlipidemia)     HTN (hypertension)     Hypercholesterolemia     Hypertension     Lumbar spinal stenosis November 2014    MI (myocardial infarction) 1999, then had cardiac stent inserted    Myocardial infarct     Obesity     Osteoporosis     Recurrent breast cancer, left diagnosed in 1991 -- had surgery and no post op chemo or RT    Reflux     Sciatica neuralgia, left in 2014 due to spinal stenosis

## 2022-03-02 NOTE — ED CDU PROVIDER INITIAL DAY NOTE - NSICDXPASTSURGICALHX_GEN_ALL_CORE_FT
PAST SURGICAL HISTORY:  Breast cancer left lumpectomy with post op chemo and RT    CAD (coronary artery disease) one cardiac stent placed in 1991 -- had surgery at [facility]    Hernia diaphramatic hernia age 8    Inguinal hernia right inguinal hernia repair    Recurrent breast cancer, left 1991 left mastectomy with reconstruction ("from the back") and the nipple -- no post op chemo or RT    S/P hernia repair     S/P lumpectomy, left breast     S/P mastectomy, left     S/P spinal fusion     Umbilical hernia

## 2022-03-02 NOTE — ED ADULT TRIAGE NOTE - CHIEF COMPLAINT QUOTE
pt sent in by PCP for hgb 7.4. Pt endorsing generalized weakness, fatigue, SOB, dizziness. Denies blood in stool, CP. RR even and unlabored. PMH HTN, DM, proteinuria, Afib on Xarelto.

## 2022-03-02 NOTE — ED CDU PROVIDER INITIAL DAY NOTE - MEDICAL DECISION MAKING DETAILS
Johnnie WYATT: anemia - pt denies any bleeding. Will check labs and transfuse as necessary. CDU for monitoring, transfusion, repeat labs. Patient will follow up outpatient.

## 2022-03-02 NOTE — ED PROVIDER NOTE - NSICDXPASTSURGICALHX_GEN_ALL_CORE_FT
PAST SURGICAL HISTORY:  Breast cancer left lumpectomy with post op chemo and RT    CAD (coronary artery disease) one cardiac stent placed in 1991 -- had surgery at [facility]    Hernia diaphramatic hernia age 8    Inguinal hernia right inguinal hernia repair    Recurrent breast cancer, left 1991 left mastectomy with reconstruction ("from the back") and the nipple -- no post op chemo or RT    S/P hernia repair     S/P lumpectomy, left breast     S/P mastectomy, left     S/P spinal fusion     Umbilical hernia

## 2022-03-02 NOTE — ED ADULT NURSE NOTE - OBJECTIVE STATEMENT
pt received spot 21. pt A+Ox3 sent by PMD for low hemoglobin. pt on xeralto. denies any signs of bleeding. denies pain and discomfort. respirations even and unlabored. denies cp/sob. respirations even and unlabored. vs as noted. unable to get IV access. facilitator Rn in the room for second attempt. will monitor.

## 2022-03-21 ENCOUNTER — APPOINTMENT (OUTPATIENT)
Dept: ORTHOPEDIC SURGERY | Facility: CLINIC | Age: 77
End: 2022-03-21
Payer: COMMERCIAL

## 2022-03-21 PROCEDURE — 20611 DRAIN/INJ JOINT/BURSA W/US: CPT | Mod: LT

## 2022-04-27 ENCOUNTER — RESULT REVIEW (OUTPATIENT)
Age: 77
End: 2022-04-27

## 2022-08-10 ENCOUNTER — APPOINTMENT (OUTPATIENT)
Dept: ORTHOPEDIC SURGERY | Facility: CLINIC | Age: 77
End: 2022-08-10

## 2022-08-10 PROCEDURE — 99214 OFFICE O/P EST MOD 30 MIN: CPT | Mod: 25

## 2022-08-10 PROCEDURE — 20611 DRAIN/INJ JOINT/BURSA W/US: CPT | Mod: 50

## 2022-10-06 NOTE — ASU PREOPERATIVE ASSESSMENT, ADULT (IPARK ONLY) - PERIPHERAL IV: INSERTION DATE
Detail Level: Zone Modify Regimen: Decrease Doxy 100mg from twice daily to once daily Continue Regimen: Retin-A 0.025% Gel daily 11-Jan-2018

## 2022-10-17 ENCOUNTER — APPOINTMENT (OUTPATIENT)
Dept: PODIATRY | Facility: CLINIC | Age: 77
End: 2022-10-17

## 2022-10-17 VITALS — HEIGHT: 68 IN | WEIGHT: 192 LBS | BODY MASS INDEX: 29.1 KG/M2

## 2022-10-17 DIAGNOSIS — I10 ESSENTIAL (PRIMARY) HYPERTENSION: ICD-10-CM

## 2022-10-17 DIAGNOSIS — R26.2 DIFFICULTY IN WALKING, NOT ELSEWHERE CLASSIFIED: ICD-10-CM

## 2022-10-17 DIAGNOSIS — Z86.39 PERSONAL HISTORY OF OTHER ENDOCRINE, NUTRITIONAL AND METABOLIC DISEASE: ICD-10-CM

## 2022-10-17 DIAGNOSIS — Z91.81 HISTORY OF FALLING: ICD-10-CM

## 2022-10-17 DIAGNOSIS — I70.91 GENERALIZED ATHEROSCLEROSIS: ICD-10-CM

## 2022-10-17 DIAGNOSIS — I48.91 UNSPECIFIED ATRIAL FIBRILLATION: ICD-10-CM

## 2022-10-17 PROCEDURE — 11056 PARNG/CUTG B9 HYPRKR LES 2-4: CPT | Mod: Q8

## 2022-10-17 PROCEDURE — 99203 OFFICE O/P NEW LOW 30 MIN: CPT | Mod: 25

## 2022-10-20 PROBLEM — I70.91 GENERALIZED ATHEROSCLEROSIS: Status: ACTIVE | Noted: 2022-10-19

## 2022-10-20 PROBLEM — R26.2 DIFFICULTY WALKING: Status: ACTIVE | Noted: 2022-10-19

## 2022-10-20 PROBLEM — Z91.81 RISK FOR FALLS: Status: ACTIVE | Noted: 2022-10-19

## 2022-10-20 NOTE — ASSESSMENT
[FreeTextEntry1] : \par Impression: Difficulty walking. Fall risk. Peripheral neuropathy. Keratosis.\par \par Treatment: I trimmed keratotic lesions x3 and I debrided dystrophic nails on this patient whose foot is at risk because of peripheral neuropathy. I explained that she is a fall risk because of the diabetes, spinal stenosis, previous back surgery and chemotherapy for breast cancer in the past. I gave her seated exercises tow work on in terms of strengthening and balance which I think will be very beneficial. I discussed that she is a fall risk and should use a cane. I trimmed keratotic lesions and I encouraged her to walk in [place] at all times.

## 2022-10-20 NOTE — PHYSICAL EXAM
[Delayed in the Right Toes] : capillary refills delayed in the right toes [Delayed in the Left Toes] : capillary refills delayed in the left toes [0] : left foot dorsalis pedis 0 [Vibration Dec.] : diminished vibratory sensation at the level of the toes [Diminished Throughout Right Foot] : diminished sensation with monofilament testing throughout right foot [FreeTextEntry3] : CFT: delayed. Absent hair growth. (Q8 Class Findings) [de-identified] : She has spinal stenosis and peripheral neuropathy. There is no drop-foot but she has a cautious shuffling gait. She has hammertoe deformity. [FreeTextEntry8] : Absent vibratory. [FreeTextEntry1] : [place]-Azam monofilament testing absent at the hallux, 1st MPJ and heel on the right.

## 2022-10-20 NOTE — HISTORY OF PRESENT ILLNESS
[Sneakers] : hillary [FreeTextEntry1] : Patient presents today for diabetic foot care. Her A1C: 7.5. She was last seen by Dr. Peraza 08/2022. She has stage 4 renal disease and feels unstable. She complains of generalized foot pain.

## 2022-12-01 NOTE — DISCUSSION/SUMMARY
Initiate Treatment: triamcinolone bid x 2 weeks, prn [de-identified] : Impression osteoarthritis left knee\par Plan trial of steroid injection.  Patient advised that ultimately she will be a candidate for total knee replacement Render In Strict Bullet Format?: No Detail Level: Zone

## 2022-12-05 ENCOUNTER — APPOINTMENT (OUTPATIENT)
Dept: PODIATRY | Facility: CLINIC | Age: 77
End: 2022-12-05

## 2022-12-05 VITALS — WEIGHT: 193 LBS | BODY MASS INDEX: 29.25 KG/M2 | HEIGHT: 68 IN

## 2022-12-05 DIAGNOSIS — M79.674 PAIN IN RIGHT TOE(S): ICD-10-CM

## 2022-12-05 PROCEDURE — 99212 OFFICE O/P EST SF 10 MIN: CPT

## 2022-12-07 PROBLEM — M79.674 PAIN OF TOE OF RIGHT FOOT: Status: ACTIVE | Noted: 2022-12-06

## 2022-12-07 NOTE — HISTORY OF PRESENT ILLNESS
[Sneakers] : hillary [FreeTextEntry1] : Patient presents today for diabetic foot care. She has left 2nd and 3rd hammertoes. Previous 3rd toe surgery. She has pain at the 2nd toe hitting into the 3rd toe. Her A1c is 6.7. She last saw Dr. Peraza 12/2/2022.\par

## 2022-12-07 NOTE — PHYSICAL EXAM
[Delayed in the Right Toes] : capillary refills delayed in the right toes [Delayed in the Left Toes] : capillary refills delayed in the left toes [0] : left foot dorsalis pedis 0 [Vibration Dec.] : diminished vibratory sensation at the level of the toes [Diminished Throughout Right Foot] : diminished sensation with monofilament testing throughout right foot [FreeTextEntry3] : CFT: delayed. Absent hair growth. The patient has a class finding of Q9-One Class B and 2 Class C findings.  [de-identified] : She has malposition right 2nd moderate semi-rigid arthritic hammertoe. She had previous surgery at the 3rd toe. There is irritation from the two of them applying against one another. [FreeTextEntry8] : Absent vibratory. [FreeTextEntry1] : [place]-Azam monofilament testing absent at the hallux, 1st MPJ and heel on the right. Paresthesias.

## 2022-12-07 NOTE — ASSESSMENT
[FreeTextEntry1] : \par Impression: Adan. Diabetic neuropathy type 2.\par \par Treatment: There is no keratosis or irritation. I gave [employer] catalog and multiple toe separators and derrick splints to use that will protect it and maintain positioning while keeping symptomatology resolved. I want her to get those. I gave her tube foam. She could soak with warm water and Epsom salt. Follow-up in the office for evaluation with continued pain that persists.

## 2023-01-01 ENCOUNTER — EMERGENCY (EMERGENCY)
Facility: HOSPITAL | Age: 78
LOS: 1 days | Discharge: HOME | End: 2023-01-01
Attending: STUDENT IN AN ORGANIZED HEALTH CARE EDUCATION/TRAINING PROGRAM
Payer: MEDICARE

## 2023-01-01 ENCOUNTER — APPOINTMENT (OUTPATIENT)
Dept: ORTHOPEDIC SURGERY | Facility: CLINIC | Age: 78
End: 2023-01-01
Payer: MEDICARE

## 2023-01-01 ENCOUNTER — APPOINTMENT (OUTPATIENT)
Dept: PODIATRY | Facility: CLINIC | Age: 78
End: 2023-01-01
Payer: MEDICARE

## 2023-01-01 ENCOUNTER — NON-APPOINTMENT (OUTPATIENT)
Age: 78
End: 2023-01-01

## 2023-01-01 ENCOUNTER — APPOINTMENT (OUTPATIENT)
Dept: VASCULAR SURGERY | Facility: CLINIC | Age: 78
End: 2023-01-01
Payer: MEDICARE

## 2023-01-01 ENCOUNTER — APPOINTMENT (OUTPATIENT)
Dept: VASCULAR SURGERY | Facility: CLINIC | Age: 78
End: 2023-01-01

## 2023-01-01 VITALS
HEIGHT: 68 IN | HEART RATE: 65 BPM | WEIGHT: 199 LBS | RESPIRATION RATE: 16 BRPM | OXYGEN SATURATION: 96 % | SYSTOLIC BLOOD PRESSURE: 108 MMHG | DIASTOLIC BLOOD PRESSURE: 62 MMHG | BODY MASS INDEX: 30.16 KG/M2

## 2023-01-01 VITALS
DIASTOLIC BLOOD PRESSURE: 78 MMHG | TEMPERATURE: 98 F | HEIGHT: 68 IN | WEIGHT: 149.91 LBS | RESPIRATION RATE: 20 BRPM | OXYGEN SATURATION: 98 % | SYSTOLIC BLOOD PRESSURE: 124 MMHG | HEART RATE: 64 BPM

## 2023-01-01 DIAGNOSIS — L85.3 XEROSIS CUTIS: ICD-10-CM

## 2023-01-01 DIAGNOSIS — I25.10 ATHEROSCLEROTIC HEART DISEASE OF NATIVE CORONARY ARTERY WITHOUT ANGINA PECTORIS: Chronic | ICD-10-CM

## 2023-01-01 DIAGNOSIS — L84 CORNS AND CALLOSITIES: ICD-10-CM

## 2023-01-01 DIAGNOSIS — M17.10 UNILATERAL PRIMARY OSTEOARTHRITIS, UNSPECIFIED KNEE: ICD-10-CM

## 2023-01-01 DIAGNOSIS — K42.9 UMBILICAL HERNIA WITHOUT OBSTRUCTION OR GANGRENE: Chronic | ICD-10-CM

## 2023-01-01 DIAGNOSIS — M17.12 UNILATERAL PRIMARY OSTEOARTHRITIS, LEFT KNEE: ICD-10-CM

## 2023-01-01 DIAGNOSIS — Z90.12 ACQUIRED ABSENCE OF LEFT BREAST AND NIPPLE: Chronic | ICD-10-CM

## 2023-01-01 DIAGNOSIS — K46.9 UNSPECIFIED ABDOMINAL HERNIA WITHOUT OBSTRUCTION OR GANGRENE: Chronic | ICD-10-CM

## 2023-01-01 DIAGNOSIS — M20.41 OTHER HAMMER TOE(S) (ACQUIRED), RIGHT FOOT: ICD-10-CM

## 2023-01-01 DIAGNOSIS — Z98.1 ARTHRODESIS STATUS: Chronic | ICD-10-CM

## 2023-01-01 DIAGNOSIS — C50.912 MALIGNANT NEOPLASM OF UNSPECIFIED SITE OF LEFT FEMALE BREAST: Chronic | ICD-10-CM

## 2023-01-01 DIAGNOSIS — N18.9 CHRONIC KIDNEY DISEASE, UNSPECIFIED: ICD-10-CM

## 2023-01-01 DIAGNOSIS — M20.42 OTHER HAMMER TOE(S) (ACQUIRED), LEFT FOOT: ICD-10-CM

## 2023-01-01 DIAGNOSIS — L60.3 NAIL DYSTROPHY: ICD-10-CM

## 2023-01-01 DIAGNOSIS — Z98.89 OTHER SPECIFIED POSTPROCEDURAL STATES: Chronic | ICD-10-CM

## 2023-01-01 DIAGNOSIS — L85.1 ACQUIRED KERATOSIS [KERATODERMA] PALMARIS ET PLANTARIS: ICD-10-CM

## 2023-01-01 DIAGNOSIS — K40.90 UNILATERAL INGUINAL HERNIA, WITHOUT OBSTRUCTION OR GANGRENE, NOT SPECIFIED AS RECURRENT: Chronic | ICD-10-CM

## 2023-01-01 DIAGNOSIS — C50.919 MALIGNANT NEOPLASM OF UNSPECIFIED SITE OF UNSPECIFIED FEMALE BREAST: Chronic | ICD-10-CM

## 2023-01-01 DIAGNOSIS — E11.49 TYPE 2 DIABETES MELLITUS WITH OTHER DIABETIC NEUROLOGICAL COMPLICATION: ICD-10-CM

## 2023-01-01 DIAGNOSIS — L98.8 OTHER SPECIFIED DISORDERS OF THE SKIN AND SUBCUTANEOUS TISSUE: ICD-10-CM

## 2023-01-01 PROCEDURE — 99214 OFFICE O/P EST MOD 30 MIN: CPT | Mod: 25

## 2023-01-01 PROCEDURE — 20611 DRAIN/INJ JOINT/BURSA W/US: CPT | Mod: LT

## 2023-01-01 PROCEDURE — 99213 OFFICE O/P EST LOW 20 MIN: CPT | Mod: 25

## 2023-01-01 PROCEDURE — 99282 EMERGENCY DEPT VISIT SF MDM: CPT

## 2023-01-01 PROCEDURE — 11719 TRIM NAIL(S) ANY NUMBER: CPT | Mod: 59

## 2023-01-01 PROCEDURE — 99283 EMERGENCY DEPT VISIT LOW MDM: CPT

## 2023-01-01 PROCEDURE — 99212 OFFICE O/P EST SF 10 MIN: CPT

## 2023-01-01 PROCEDURE — 99202 OFFICE O/P NEW SF 15 MIN: CPT

## 2023-01-01 PROCEDURE — 11720 DEBRIDE NAIL 1-5: CPT | Mod: 59

## 2023-01-01 PROCEDURE — 73630 X-RAY EXAM OF FOOT: CPT | Mod: LT

## 2023-01-01 PROCEDURE — 20611 DRAIN/INJ JOINT/BURSA W/US: CPT | Mod: 50

## 2023-01-01 PROCEDURE — 11056 PARNG/CUTG B9 HYPRKR LES 2-4: CPT

## 2023-01-01 RX ORDER — HYDROCORTISONE 1 %
12 CREAM (GRAM) TOPICAL TWICE DAILY
Qty: 1 | Refills: 2 | Status: ACTIVE | COMMUNITY
Start: 2023-01-01 | End: 1900-01-01

## 2023-01-01 RX ORDER — HYALURONATE SODIUM, STABILIZED 88 MG/4 ML
88 SYRINGE (ML) INTRAARTICULAR
Qty: 2 | Refills: 0 | Status: ACTIVE | COMMUNITY
Start: 2023-01-01

## 2023-01-09 ENCOUNTER — APPOINTMENT (OUTPATIENT)
Dept: ORTHOPEDIC SURGERY | Facility: CLINIC | Age: 78
End: 2023-01-09
Payer: MEDICARE

## 2023-01-09 PROCEDURE — 99214 OFFICE O/P EST MOD 30 MIN: CPT | Mod: 25

## 2023-01-09 PROCEDURE — 20611 DRAIN/INJ JOINT/BURSA W/US: CPT | Mod: 50

## 2023-02-23 ENCOUNTER — APPOINTMENT (OUTPATIENT)
Dept: PODIATRY | Facility: CLINIC | Age: 78
End: 2023-02-23
Payer: MEDICARE

## 2023-02-23 PROCEDURE — 11719 TRIM NAIL(S) ANY NUMBER: CPT | Mod: 59

## 2023-02-23 PROCEDURE — 11056 PARNG/CUTG B9 HYPRKR LES 2-4: CPT

## 2023-02-28 NOTE — ASSESSMENT
[FreeTextEntry1] : \par Impression: Diabetes with neuropathy. Onychodystrophy. IPK's. \par \par Treatment: Discussed the importance of glycemic control and diabetic foot care. Patient is wearing adequately fitted sneakers today. Nails 1 through 5 bilaterally with alcohol and trimmed with sterile nippers to hygienic length. All hyperkeratosis and IPK's were wiped with alcohol and trimmed with a sterile #15 blade. Will see patient back again in 3 months.

## 2023-02-28 NOTE — HISTORY OF PRESENT ILLNESS
[Sneakers] : hillary [FreeTextEntry1] :  Patient presents today for for follow-up of painful hyperkeratotic lesions and diabetic foot evaluation. She did not check her finger stick today. A1c is unknown. She last saw her primary care doctor 3 months ago.

## 2023-02-28 NOTE — PHYSICAL EXAM
[Delayed in the Right Toes] : capillary refills delayed in the right toes [Delayed in the Left Toes] : capillary refills delayed in the left toes [0] : left foot dorsalis pedis 0 [Vibration Dec.] : diminished vibratory sensation at the level of the toes [Diminished Throughout Right Foot] : diminished sensation with monofilament testing throughout right foot [FreeTextEntry3] : CFT: delayed. Absent hair growth. The patient has a class finding of Q9-One Class B and 2 Class C findings.  [de-identified] : She has malposition right 2nd moderate semi-rigid arthritic hammertoe. She had previous surgery at the 3rd toe. There is irritation from the two of them applying against one another. [FreeTextEntry8] : Absent vibratory. [FreeTextEntry1] : [place]-Azam monofilament testing absent at the hallux, 1st MPJ and heel on the right. Paresthesias.

## 2023-05-16 PROBLEM — M17.10 ARTHRITIS OF KNEE: Status: ACTIVE | Noted: 2023-01-01

## 2023-05-20 NOTE — DISCUSSION/SUMMARY
[de-identified] : Julissa Olvera is a 77 year F with bilateral Knee OA, was last seen on January 9th 2023 for bilateral knee Monovisc injections, who presents with 1 week of increasing knee pain Left worse than right. Explained to patient that HA injections are generally only done every 6 months which would be June 9th. Patient agrees that cortisone injections would be a good temporary option as it provided her with relief in the past, and that she can follow up at a later date for HA injections which provided her with good relief for about 5 months. \par \par Caitlin Sanchez MD, EdM\par Sports Medicine [facility]\par Department of Orthopedics

## 2023-05-20 NOTE — HISTORY OF PRESENT ILLNESS
[de-identified] : VALERIA CHO 76 year F  who following up today with bilateral knee pain, currently left greater than right. Patient was last seen on January 9th 2023 for bilateral knee Monovisc injections. Patient states that the injections had provided good relief until last week when she had a flare up of her left knee. Says it is constantly aching, hurts when walking, and doing stairs. Is also concerned that her right knee is going to start flaring up. Denies any recent trauma. \par \par  Her knees do lock into place at times and her left knee does give out on her as well. Patient last bilateral cortisone injections were 8/10/22 which provided relief. She is interested in repeat bilateral cortisone injections today.\par \par Of note: Patient states she was recently diagnosed with stage 4 kidney disease.

## 2023-05-20 NOTE — PROCEDURE
[de-identified] : \par Ultrasound Guided Injection \par Indication for ultrasound guidance: Ensure placement within the knee joint\par \par Utlizing the Sonosite Xporte portable ultrasound machine, the Linear 6cm 13-6 MHz transducer and sterile ultrasound gel, ultrasound guidance with the probe in the short axis, utilizing an in plane approach, was used for the following injection:\par \par \par Injection: left knee joint.\par Indication: Osteoarthritis.\par \par A discussion was had with the patient regarding this procedure and all questions were answered. All risks, benefits and alternatives were discussed. These included but were not limited to bleeding, infection, allergic reaction and reaccumulation of fluid. A timeout was done to ensure correct side and pt agreed to the procedure.  Chlorhexidine was used to sterilize and prep the area in the superolateral joint line aspect of the knee. A 25 -gauge needle was used to inject 3cc of 1% lidocaine without epinephrine.  A 22-gauge needle was used to inject 4cc of 1% lidocaine without epinephrine and 1cc of 40mg/ml methylprednisolone into the knee. A sterile bandage was then applied. The patient tolerated the procedure well.\par  \par Utlizing the Sonosite Xporte portable ultrasound machine, the Linear 6cm 13-6 MHz transducer and sterile ultrasound gel, ultrasound guidance with the probe in the short axis, utilizing an in plane approach, was used for the following injection:\par \par \par Injection: right knee joint.\par Indication: Osteoarthritis.\par \par A discussion was had with the patient regarding this procedure and all questions were answered. All risks, benefits and alternatives were discussed. These included but were not limited to bleeding, infection, allergic reaction and reaccumulation of fluid. A timeout was done to ensure correct side and pt agreed to the procedure.  Chlorhexidine was used to sterilize and prep the area in the superolateral joint line aspect of the knee. A 25 -gauge needle was used to inject 3cc of 1% lidocaine without epinephrine.  A 22-gauge needle was used to inject 4cc of 1% lidocaine without epinephrine and 1cc of 40mg/ml methylprednisolone into the knee. A sterile bandage was then applied. The patient tolerated the procedure well.

## 2023-05-20 NOTE — PHYSICAL EXAM
[de-identified] : Constitutional: Well-nourished, well-developed, No acute distress\par Respiratory:  Good respiratory effort, no SOB\par Lymphatic: No regional lymphadenopathy, no lymphedema\par Psychiatric: Pleasant and normal affect, alert and oriented x3\par Skin: Clean dry and intact B/L UE/LE\par Musculoskeletal: normal except where as noted in regional exam\par \par Bilateral Knees:\par APPEARANCE: + effusion\par POSITIVE TENDERNESS:  medial joint line. lateral join line on left\par ROM: full & pain with flexion\par SPECIAL TESTS: stable v/v stress.\par NEURO: Normal sensation of LE\par PULSES: 2+ DP/PT pulses\par B/L Hips: No asymmetry, malalignment, or swelling, Full ROM, 5/5 strength in flexion/ext, IR/ER, Abd/Add, Joints stable\par B/L Ankles: No asymmetry, malalignment, or swelling, Full ROM, 5/5 strength in DF/PF/Inv/Ev, Joints stable

## 2023-05-24 PROBLEM — L85.1 KERATODERMA, ACQUIRED: Status: ACTIVE | Noted: 2022-10-19

## 2023-06-01 NOTE — PROCEDURE
[FreeTextEntry1] : X-rays were taken of the left foot to evaluate for arthritic changes or any residual fractures.\par (3 views - weight bearing) No fractures/dislocations or erosions noted.  Toe deformities visible 2 through 4.  Global osteopenia present.  \par 2nd and 3rd tarsometatarsal joint decreased joint space without subchondral sclerosis or spurring noted.

## 2023-06-01 NOTE — PHYSICAL EXAM
[Delayed in the Right Toes] : capillary refills delayed in the right toes [Delayed in the Left Toes] : capillary refills delayed in the left toes [0] : left foot posterior tibialis 0 [1+] : left foot dorsalis pedis 1+ [Diminished Throughout Right Foot] : diminished sensation with monofilament testing throughout right foot [Diminished Throughout Left Foot] : diminished sensation with monofilament testing throughout left foot [FreeTextEntry3] : CFT: Delayed x 10.  Temperature gradient: warm to cool.  Raynaud's present, bilateral.   [de-identified] : Semi-rigid hammertoes, bilaterally.  Left dorsal midfoot tarsometatarsal 2, 3 area has pain with palpation.  Mild swelling compared to contralateral side.  No bony prominences felt.  No erythema or increase in temperature.  No open lesions.   [FreeTextEntry4] : absent [FreeTextEntry8] : absent [FreeTextEntry1] : [place]-Azam testing absent at the hallux, 1st MPJ and heels, right side.  Paresthesias are present, bilateral.  Q9-The patient has a class finding of Q9-One Class B and 2 Class C findings

## 2023-06-01 NOTE — ASSESSMENT
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  Nail dystrophy (L60.3).  IPK's (L85.1).  Left foot pain (M79).  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  \par \par Treatment: Discussed etiology and treatment options. \par Regarding the left foot post traumatic arthritis, I advised patient to use over-the-counter Voltaren gel as needed.  Do warm compresses and hydrate well. \par Due to patient's kidney problems, she cannot drink electrolytes.\par I gave patient an option for a steroid injection; however, I did inform her that it will cause transient hyperglycemia.  Will defer the injection at this time.  If the pain becomes more bothersome, she can return and consider it at that time.  \par Bilateral nails 1 through 5 were wiped with alcohol and with sterile nippers, trimmed to hygienic lengths. \par The hyperkeratoses were wiped with alcohol and trimmed with a sterile #15 blade.  \par Return: 3 months or earlier if she has any problems.  
N/A

## 2023-06-01 NOTE — HISTORY OF PRESENT ILLNESS
[Sneakers] : hillary [FreeTextEntry1] : Patient returns for follow up of painful hyperkeratotic lesions and elongated toenails.  She is also complaining of left dorsal midfoot pain that started about 1.5 years ago, when she dropped a heavy bottle of shampoo on her foot.  She was conservatively treated at a different location with a boot.  She states that since the fracture incident, the pain has gotten better but not fully resolved.  She states that in the middle of the night, it feels like a cramp over the area.  She has to stand up and walk on it.  She feels like the bones are popping back in place.  She also sometimes gets pain during the day in the dorsal midfoot as well.  Denies any redness or swelling.  She has tried changing shoe gear without any improvement.  \par Patient is diabetic; does not check her sugars regularly.  Her last A1c was 7.2%.  She last saw her primary care doctor in April.

## 2023-08-17 PROBLEM — M17.12 ARTHRITIS OF KNEE, LEFT: Status: ACTIVE | Noted: 2021-10-27

## 2023-09-17 NOTE — ED ADULT TRIAGE NOTE - BSA (M2)
Providers





- Providers


Date of Admission: 


03/15/18 19:59





Date of discharge: 03/17/18


Attending physician: 


YOU PEREZ





 





03/15/18 23:49


Consult to Physician [CONS] Routine 


   Comment: Called to [phone] @ 0855 on 3/16/18


   Consulting Provider: SHELTON POWELL


   Physician Instructions: 


   Reason For Exam: Fx Fibula





03/16/18 06:42


Consult to Case Management [CONS] Routine 


   Services Needed at Discharge: Home Health Services


                                   


   Notified:: Case Management


   Phone number called:: [phone]


   Was contact made?: Yes


   If yes, spoke with:: Tamela


   Time called:: 08:35


   Comment:: SNF placement





03/16/18 08:51


Occupational Therapy Evaluate and Treat [CONS] Routine 


   Comment: 


   Reason For Exam: DEBILITY


Physical Therapy Evaluation and Treat [CONS] Routine 


   Comment: 


   Reason For Exam: DEBILITY





03/16/18 13:46


Physical Therapy Evaluation and Treat [CONS] Routine 


   Comment: 


   Reason For Exam: gait trainging WBAT


   Weight bearing status?: Full wt bearing


   Assistive devices?: Yes


   If so list: Walker











Primary care physician: 


PRIMARY CARE MD








Hospitalization


Reason for admission: fall


Condition: Serious


Hospital course: 





This is an 79-year-old female presented through the emergency room with 

significant past medical history of Parkinson's disease who suffered a fall 

prior to admission.  The fall was as a result of losing her balance.  CT scan 

of the head showed no acute process.  Patient was noted to have elevated 

troponin and was seen by cardiology in consultation.  12 lead ECG, non-

diagnostic due to motion artifact. Telemetry strips showed a sinus rhythm with 

occasional PVCs.  No further recommendations by cardiology.  Xrays reviewed and 

revealed old healed fx's to both proximal fibulas, no recent injury detected.  

The patient was seen by orthopedics in consultation who recommended no further 

intervention/surgery.  Dedicated discharge time 35 minutes.





Disposition: DC/TX-03 SNF W [facility] CERT


Time spent for discharge: 35





- Discharge Diagnoses


(1) Falls


Status: Acute   


Qualifiers: 


   Encounter type: initial encounter   Qualified Code(s): W19.XXXA - 

Unspecified fall, initial encounter   





(2) Parkinson's disease


Status: Acute   





(3) Weakness


Status: Acute   





Core Measure Documentation





- Palliative Care


Palliative Care/ Comfort Measures: Not Applicable





- Core Measures


Any of the following diagnoses?: none





Exam





- Constitutional


Vitals: 


 











Temp Pulse Resp BP Pulse Ox


 


 98.7 F   67   20   132/57   97 


 


 03/16/18 09:17  03/16/18 22:00  03/16/18 09:17  03/16/18 09:19  03/16/18 16:24











General appearance: Present: no acute distress, well-nourished





- EENT


Eyes: Present: PERRL


ENT: hearing intact, clear oral mucosa





- Neck


Neck: Present: supple, normal ROM





- Respiratory


Respiratory effort: normal


Respiratory: bilateral: CTA





- Cardiovascular


Heart Sounds: Present: S1 & S2.  Absent: rub, click





- Extremities


Extremities: pulses symmetrical, No edema


Peripheral Pulses: within normal limits





- Abdominal


General gastrointestinal: Present: soft, non-tender, non-distended, normal 

bowel sounds


Female genitourinary: Present: normal





- Integumentary


Integumentary: Present: clear, warm, dry





- Musculoskeletal


Musculoskeletal: gait normal, strength equal bilaterally





- Psychiatric


Psychiatric: appropriate mood/affect, intact judgment & insight





- Neurologic


Neurologic: CNII-XII intact, moves all extremities





Plan


Activity: no restrictions


Weight Bearing Status: Weight Bear as Tolerated


Diet: regular


Special Instructions: follow up in rehab


Follow up with: 


PRIMARY CARE,MD [Primary Care Provider] - 7 Days


SHELTON POWELL MD [Staff Physician] - 7 Days 1.81

## 2023-09-17 NOTE — ED ADULT TRIAGE NOTE - CHIEF COMPLAINT QUOTE
I have reviewed and confirmed nurses' notes... feeling depressed today after Left arm fistula was malfuctioning   Denies SI/HI

## 2023-09-17 NOTE — ED ADULT NURSE NOTE - OBJECTIVE STATEMENT
79 y/o F with PMH of cancer, DM, HTN presents to ED complaining of depression. Pt reports feeling depressed today after her fistula on her left arm was malfunctioning. Pt is going through a lot of treatments and was just feeling like it was "never ending'. Pt reports having 2 drinks today and has about 1 drink daily. No drug use. Pt denies any SI/HI.  at bedside. A&Ox4. Pt appears upset and reports "its just one of those days". MD Do at bedside, as per MD, no need for 1:1.

## 2023-09-17 NOTE — ED PROVIDER NOTE - NSICDXPASTMEDICALHX_GEN_ALL_CORE_FT
PAST MEDICAL HISTORY:  Anxiety     Atrial fibrillation     Atrial fibrillation     Breast cancer left breast cancer diagnose din 1986 -- had lumpectomy with post op chemo and RT    Breast cancer s/p left lumpectomy and mastectomy    CAD (coronary artery disease)     CAD (coronary artery disease) s/p 1 stent (1999)    CAD (coronary artery disease)     Diabetes type II diagnosed in 2003 -- doesn't do finger sticks    Diabetes mellitus type 2    Elevated WBC count h/o having elevated WBCs -- as high as 13,000    HLD (hyperlipidemia)     HTN (hypertension)     Hypercholesterolemia     Hypertension     Lumbar spinal stenosis November 2014    MI (myocardial infarction) 1999, then had cardiac stent inserted    Myocardial infarct     Obesity     Osteoporosis     Recurrent breast cancer, left diagnosed in 1991 -- had surgery and no post op chemo or RT    Reflux     Sciatica neuralgia, left in 2014 due to spinal stenosis

## 2023-09-17 NOTE — ED PROVIDER NOTE - PATIENT PORTAL LINK FT
You can access the FollowMyHealth Patient Portal offered by [facility] by registering at the following website: [email or web address]. By joining [email or web address]’s FollowMyHealth portal, you will also be able to view your health information using other applications (apps) compatible with our system.

## 2023-09-17 NOTE — ED PROVIDER NOTE - NSFOLLOWUPINSTRUCTIONS_ED_ALL_ED_FT
You were seen in the Emergency Department for being upset over your current medical conditions.    See your vascular surgeon tomorrow.    If you have fever, chills, nausea, vomiting, new or worsening pain, or if you have any new symptoms return to the Emergency Department.

## 2023-09-17 NOTE — ED PROVIDER NOTE - ATTENDING CONTRIBUTION TO CARE
hKang Do, DO: I have personally performed a face to face medical and diagnostic evaluation of the patient. I have discussed with and reviewed the Resident's and/or ACP's and/or Medical/PA/NP student's note and agree with the History, ROS, Physical Exam and MDM unless otherwise indicated. A brief summary of my personal evaluation and impression can be found below.     69 female history of CKD presents the ED for that her fistula is not working which she had performed several days ago, says she was supposed to hear a thrill or a whoosh sound which she has not been and was told that she needs to see her doctor for evaluation.  Patient is scheduled to see the doctor tomorrow.  Patient states she is just upset because it was very hard for her to get the surgery and she still feeling upset. No SI no HI patient states she has been having a hard time doing with her chronic kidney disease and was just really upset by the news and felt like she had to come talk to somebody in the ER.  Had extensive talk with patient, and by the end of evaluation patient felt much better.    CONSTITUTIONAL: NAD, Tearful  SKIN: Warm dry, normal skin turgor, Left forearm AV fistula without palpable thrill, palpable pulse.  No thrill on auscultation.  HEAD: NCAT  EYES: EOMI, PERRLA, no scleral icterus, conjunctiva pink  NECK: Supple; non tender. Full ROM.  CARD: RRR  RESP: No respiratory distress  ABD: non-distended  MSK: Full ROM, no leg swelling  PSYCH: Cooperative, appropriate.     Patient upset over current life circumstance, but otherwise pleasant patient shows insight said that she just felt really overwhelmed by possible failure from her fistula and did not know what to do but after speaking to her for a brief period she felt better and said that she is can see her doctor tomorrow.  No SI or HI, patient states she feels okay and is with her , denies chronic depression but states that she has been very frustrated with her current medical situation.  Patient states she wants to leave and see her doctor tomorrow, no indication for involuntary evaluation admission.  Patient is okay for discharge.  Patient left without receiving her paperwork.  Will have follow-up service called to refer patient to psychiatry for management of symptoms of chronic disease.,

## 2023-10-23 PROBLEM — M20.41 HAMMER TOE OF RIGHT FOOT: Status: ACTIVE | Noted: 2022-12-06

## 2023-10-23 PROBLEM — M20.42 HAMMER TOE OF LEFT FOOT: Status: ACTIVE | Noted: 2023-01-01

## 2023-10-23 PROBLEM — L85.3 XEROSIS CUTIS: Status: ACTIVE | Noted: 2023-01-01

## 2023-10-25 PROBLEM — N18.9 CHRONIC KIDNEY DISEASE: Status: ACTIVE | Noted: 2022-10-17

## 2023-12-13 NOTE — ED CLERICAL - DIVISION
From: Jose C Ruiz  To: Joanne Ndiaye  Sent: 12/13/2023 12:33 PM CST  Subject: hydroxychloroquine 200 MG Tabs    Good afternoon Dr. Zion Romero -   With my new insurance I've had to switch pharmacies. Can you please send a new prescription for my hydroxychloroquine 200 MG Tabs sent over to [facility] at [facility] in [place]?   Thank you  Betty Zuniga [facility]...

## 2023-12-21 PROBLEM — M17.12 PRIMARY OSTEOARTHRITIS OF LEFT KNEE: Status: ACTIVE | Noted: 2021-10-27

## 2023-12-22 PROBLEM — E11.49 TYPE 2 DIABETES MELLITUS WITH OTHER NEUROLOGIC COMPLICATION: Status: ACTIVE | Noted: 2022-10-17

## 2023-12-22 PROBLEM — L60.3 NAIL DYSTROPHY: Status: ACTIVE | Noted: 2023-02-27

## 2023-12-27 PROBLEM — L84 CALLUS OF FOOT: Status: ACTIVE | Noted: 2023-01-01

## 2023-12-27 PROBLEM — L98.8 FISTULA: Status: ACTIVE | Noted: 2023-01-01

## 2023-12-27 NOTE — PHYSICAL EXAM
[Delayed in the Right Toes] : capillary refills delayed in the right toes [Delayed in the Left Toes] : capillary refills delayed in the left toes [0] : left foot posterior tibialis 0 [1+] : left foot dorsalis pedis 1+ [Diminished Throughout Right Foot] : diminished sensation with monofilament testing throughout right foot [Diminished Throughout Left Foot] : diminished sensation with monofilament testing throughout left foot [FreeTextEntry3] : CFT: Delayed x 10.  Temperature gradient: warm to cool.  Raynaud's present, bilateral.   [de-identified] : Bilateral bunion and hammertoe deformity present.  No joint effusions or erythema.   [FreeTextEntry4] : absent [FreeTextEntry8] : absent [FreeTextEntry1] : [place] testing absent at the hallux, 1st MPJ and heels, right side.  Paresthesias are present, bilateral.  Q9-The patient has a class finding of Q9-One Class B and 2 Class C findings

## 2023-12-27 NOTE — ASSESSMENT
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  Nail dystrophy (L60.3).  Callus (L8).  IPK's (L85.1).    Treatment: Discussed the importance of glycemic control, diabetic foot care and neuropathic precautions.  Advised patient to wear shoes with a slightly bigger and wider toebox, especially in the winter to decrease hyperkeratotic skin build up .  Bilateral hallux nails were debrided of excessive thickness and length to appropriate levels of comfort and contour using sterile nippers and Dremel.   The procedure was tolerated well without complications.  The rest of the digits were wiped with alcohol and trimmed to hygienic lengths.  All IPK's/hyperkeratoses were wiped with alcohol and trimmed with a sterile #15 blade.   Advised patient to moisturize daily.   Return: 3 months.

## 2023-12-27 NOTE — HISTORY OF PRESENT ILLNESS
[Sneakers] : hillary [FreeTextEntry1] : Patient returns today for at-risk foot care and for management of painful hyperkeratotic lesions and painful, thickened, elongated nails.  She denies any new medical changes; however, she will be going for EV fistula creation soon.   Patient is diabetic.  Her last A1c was 7%.  She last saw her primary care doctor 2 weeks ago.

## 2024-01-01 ENCOUNTER — INPATIENT (INPATIENT)
Facility: HOSPITAL | Age: 79
LOS: 0 days | DRG: 296 | End: 2024-04-11
Attending: STUDENT IN AN ORGANIZED HEALTH CARE EDUCATION/TRAINING PROGRAM | Admitting: STUDENT IN AN ORGANIZED HEALTH CARE EDUCATION/TRAINING PROGRAM
Payer: MEDICARE

## 2024-01-01 VITALS — OXYGEN SATURATION: 100 % | HEART RATE: 185 BPM

## 2024-01-01 VITALS
RESPIRATION RATE: 18 BRPM | HEART RATE: 73 BPM | TEMPERATURE: 98 F | WEIGHT: 199.96 LBS | SYSTOLIC BLOOD PRESSURE: 165 MMHG | DIASTOLIC BLOOD PRESSURE: 82 MMHG | OXYGEN SATURATION: 98 %

## 2024-01-01 DIAGNOSIS — Z98.89 OTHER SPECIFIED POSTPROCEDURAL STATES: Chronic | ICD-10-CM

## 2024-01-01 DIAGNOSIS — K40.90 UNILATERAL INGUINAL HERNIA, WITHOUT OBSTRUCTION OR GANGRENE, NOT SPECIFIED AS RECURRENT: Chronic | ICD-10-CM

## 2024-01-01 DIAGNOSIS — Z98.1 ARTHRODESIS STATUS: Chronic | ICD-10-CM

## 2024-01-01 DIAGNOSIS — I25.10 ATHEROSCLEROTIC HEART DISEASE OF NATIVE CORONARY ARTERY WITHOUT ANGINA PECTORIS: Chronic | ICD-10-CM

## 2024-01-01 DIAGNOSIS — S72.141A DISPLACED INTERTROCHANTERIC FRACTURE OF RIGHT FEMUR, INITIAL ENCOUNTER FOR CLOSED FRACTURE: ICD-10-CM

## 2024-01-01 DIAGNOSIS — C50.919 MALIGNANT NEOPLASM OF UNSPECIFIED SITE OF UNSPECIFIED FEMALE BREAST: Chronic | ICD-10-CM

## 2024-01-01 DIAGNOSIS — C50.912 MALIGNANT NEOPLASM OF UNSPECIFIED SITE OF LEFT FEMALE BREAST: Chronic | ICD-10-CM

## 2024-01-01 DIAGNOSIS — K46.9 UNSPECIFIED ABDOMINAL HERNIA WITHOUT OBSTRUCTION OR GANGRENE: Chronic | ICD-10-CM

## 2024-01-01 DIAGNOSIS — K42.9 UMBILICAL HERNIA WITHOUT OBSTRUCTION OR GANGRENE: Chronic | ICD-10-CM

## 2024-01-01 DIAGNOSIS — Z90.12 ACQUIRED ABSENCE OF LEFT BREAST AND NIPPLE: Chronic | ICD-10-CM

## 2024-01-01 LAB
ALBUMIN SERPL ELPH-MCNC: 3 G/DL — LOW (ref 3.3–5)
ALBUMIN SERPL ELPH-MCNC: 3.3 G/DL — SIGNIFICANT CHANGE UP (ref 3.3–5)
ALP SERPL-CCNC: 109 U/L — SIGNIFICANT CHANGE UP (ref 40–120)
ALP SERPL-CCNC: 109 U/L — SIGNIFICANT CHANGE UP (ref 40–120)
ALT FLD-CCNC: 29 U/L — SIGNIFICANT CHANGE UP (ref 10–45)
ALT FLD-CCNC: 63 U/L — HIGH (ref 10–45)
ANION GAP SERPL CALC-SCNC: 12 MMOL/L — SIGNIFICANT CHANGE UP (ref 5–17)
ANION GAP SERPL CALC-SCNC: 20 MMOL/L — HIGH (ref 5–17)
ANION GAP SERPL CALC-SCNC: 23 MMOL/L — HIGH (ref 5–17)
APTT BLD: 31.6 SEC — SIGNIFICANT CHANGE UP (ref 24.5–35.6)
APTT BLD: 41.1 SEC — HIGH (ref 24.5–35.6)
AST SERPL-CCNC: 43 U/L — HIGH (ref 10–40)
AST SERPL-CCNC: 82 U/L — HIGH (ref 10–40)
BASOPHILS # BLD AUTO: 0.07 K/UL — SIGNIFICANT CHANGE UP (ref 0–0.2)
BASOPHILS NFR BLD AUTO: 0.6 % — SIGNIFICANT CHANGE UP (ref 0–2)
BILIRUB SERPL-MCNC: 0.3 MG/DL — SIGNIFICANT CHANGE UP (ref 0.2–1.2)
BILIRUB SERPL-MCNC: 1 MG/DL — SIGNIFICANT CHANGE UP (ref 0.2–1.2)
BLD GP AB SCN SERPL QL: NEGATIVE — SIGNIFICANT CHANGE UP
BUN SERPL-MCNC: 55 MG/DL — HIGH (ref 7–23)
BUN SERPL-MCNC: 56 MG/DL — HIGH (ref 7–23)
BUN SERPL-MCNC: 58 MG/DL — HIGH (ref 7–23)
CALCIUM SERPL-MCNC: 10 MG/DL — SIGNIFICANT CHANGE UP (ref 8.4–10.5)
CALCIUM SERPL-MCNC: 9.3 MG/DL — SIGNIFICANT CHANGE UP (ref 8.4–10.5)
CALCIUM SERPL-MCNC: 9.4 MG/DL — SIGNIFICANT CHANGE UP (ref 8.4–10.5)
CHLORIDE SERPL-SCNC: 107 MMOL/L — SIGNIFICANT CHANGE UP (ref 96–108)
CHLORIDE SERPL-SCNC: 108 MMOL/L — SIGNIFICANT CHANGE UP (ref 96–108)
CHLORIDE SERPL-SCNC: 110 MMOL/L — HIGH (ref 96–108)
CO2 SERPL-SCNC: 10 MMOL/L — CRITICAL LOW (ref 22–31)
CO2 SERPL-SCNC: 13 MMOL/L — LOW (ref 22–31)
CO2 SERPL-SCNC: 19 MMOL/L — LOW (ref 22–31)
CREAT SERPL-MCNC: 3.77 MG/DL — HIGH (ref 0.5–1.3)
CREAT SERPL-MCNC: 3.84 MG/DL — HIGH (ref 0.5–1.3)
CREAT SERPL-MCNC: 4.13 MG/DL — HIGH (ref 0.5–1.3)
EGFR: 10 ML/MIN/1.73M2 — LOW
EGFR: 11 ML/MIN/1.73M2 — LOW
EGFR: 12 ML/MIN/1.73M2 — LOW
EOSINOPHIL # BLD AUTO: 0.02 K/UL — SIGNIFICANT CHANGE UP (ref 0–0.5)
EOSINOPHIL NFR BLD AUTO: 0.2 % — SIGNIFICANT CHANGE UP (ref 0–6)
FLUAV AG NPH QL: SIGNIFICANT CHANGE UP
FLUBV AG NPH QL: SIGNIFICANT CHANGE UP
GAS PNL BLDA: SIGNIFICANT CHANGE UP
GLUCOSE BLDC GLUCOMTR-MCNC: 222 MG/DL — HIGH (ref 70–99)
GLUCOSE SERPL-MCNC: 181 MG/DL — HIGH (ref 70–99)
GLUCOSE SERPL-MCNC: 201 MG/DL — HIGH (ref 70–99)
GLUCOSE SERPL-MCNC: 286 MG/DL — HIGH (ref 70–99)
HCT VFR BLD CALC: 38.1 % — SIGNIFICANT CHANGE UP (ref 34.5–45)
HCT VFR BLD CALC: 40.1 % — SIGNIFICANT CHANGE UP (ref 34.5–45)
HCT VFR BLD CALC: 41.6 % — SIGNIFICANT CHANGE UP (ref 34.5–45)
HGB BLD-MCNC: 11 G/DL — LOW (ref 11.5–15.5)
HGB BLD-MCNC: 12.1 G/DL — SIGNIFICANT CHANGE UP (ref 11.5–15.5)
HGB BLD-MCNC: 12.5 G/DL — SIGNIFICANT CHANGE UP (ref 11.5–15.5)
IMM GRANULOCYTES NFR BLD AUTO: 1 % — HIGH (ref 0–0.9)
INR BLD: 1.3 RATIO — HIGH (ref 0.85–1.18)
INR BLD: 1.48 RATIO — HIGH (ref 0.85–1.18)
LYMPHOCYTES # BLD AUTO: 1.42 K/UL — SIGNIFICANT CHANGE UP (ref 1–3.3)
LYMPHOCYTES # BLD AUTO: 12.3 % — LOW (ref 13–44)
MAGNESIUM SERPL-MCNC: 2 MG/DL — SIGNIFICANT CHANGE UP (ref 1.6–2.6)
MAGNESIUM SERPL-MCNC: 2.1 MG/DL — SIGNIFICANT CHANGE UP (ref 1.6–2.6)
MCHC RBC-ENTMCNC: 28.9 GM/DL — LOW (ref 32–36)
MCHC RBC-ENTMCNC: 29 PG — SIGNIFICANT CHANGE UP (ref 27–34)
MCHC RBC-ENTMCNC: 29 PG — SIGNIFICANT CHANGE UP (ref 27–34)
MCHC RBC-ENTMCNC: 29.5 PG — SIGNIFICANT CHANGE UP (ref 27–34)
MCHC RBC-ENTMCNC: 30 GM/DL — LOW (ref 32–36)
MCHC RBC-ENTMCNC: 30.2 GM/DL — LOW (ref 32–36)
MCV RBC AUTO: 100.5 FL — HIGH (ref 80–100)
MCV RBC AUTO: 96.5 FL — SIGNIFICANT CHANGE UP (ref 80–100)
MCV RBC AUTO: 97.8 FL — SIGNIFICANT CHANGE UP (ref 80–100)
MONOCYTES # BLD AUTO: 0.77 K/UL — SIGNIFICANT CHANGE UP (ref 0–0.9)
MONOCYTES NFR BLD AUTO: 6.7 % — SIGNIFICANT CHANGE UP (ref 2–14)
NEUTROPHILS # BLD AUTO: 9.1 K/UL — HIGH (ref 1.8–7.4)
NEUTROPHILS NFR BLD AUTO: 79.2 % — HIGH (ref 43–77)
NRBC # BLD: 1 /100 WBCS — HIGH (ref 0–0)
NRBC # BLD: 1 /100 WBCS — HIGH (ref 0–0)
NRBC # BLD: 2 /100 WBCS — HIGH (ref 0–0)
PHOSPHATE SERPL-MCNC: 7.6 MG/DL — HIGH (ref 2.5–4.5)
PHOSPHATE SERPL-MCNC: 8 MG/DL — HIGH (ref 2.5–4.5)
PLATELET # BLD AUTO: 178 K/UL — SIGNIFICANT CHANGE UP (ref 150–400)
PLATELET # BLD AUTO: 181 K/UL — SIGNIFICANT CHANGE UP (ref 150–400)
PLATELET # BLD AUTO: 246 K/UL — SIGNIFICANT CHANGE UP (ref 150–400)
POTASSIUM SERPL-MCNC: 5.3 MMOL/L — SIGNIFICANT CHANGE UP (ref 3.5–5.3)
POTASSIUM SERPL-MCNC: 6.3 MMOL/L — CRITICAL HIGH (ref 3.5–5.3)
POTASSIUM SERPL-MCNC: 6.7 MMOL/L — CRITICAL HIGH (ref 3.5–5.3)
POTASSIUM SERPL-SCNC: 5.3 MMOL/L — SIGNIFICANT CHANGE UP (ref 3.5–5.3)
POTASSIUM SERPL-SCNC: 6.3 MMOL/L — CRITICAL HIGH (ref 3.5–5.3)
POTASSIUM SERPL-SCNC: 6.7 MMOL/L — CRITICAL HIGH (ref 3.5–5.3)
PROT SERPL-MCNC: 5.5 G/DL — LOW (ref 6–8.3)
PROT SERPL-MCNC: 6.5 G/DL — SIGNIFICANT CHANGE UP (ref 6–8.3)
PROTHROM AB SERPL-ACNC: 13.5 SEC — HIGH (ref 9.5–13)
PROTHROM AB SERPL-ACNC: 15.4 SEC — HIGH (ref 9.5–13)
RBC # BLD: 3.79 M/UL — LOW (ref 3.8–5.2)
RBC # BLD: 4.1 M/UL — SIGNIFICANT CHANGE UP (ref 3.8–5.2)
RBC # BLD: 4.31 M/UL — SIGNIFICANT CHANGE UP (ref 3.8–5.2)
RBC # FLD: 16.4 % — HIGH (ref 10.3–14.5)
RBC # FLD: 16.6 % — HIGH (ref 10.3–14.5)
RBC # FLD: 17.1 % — HIGH (ref 10.3–14.5)
RH IG SCN BLD-IMP: POSITIVE — SIGNIFICANT CHANGE UP
RSV RNA NPH QL NAA+NON-PROBE: SIGNIFICANT CHANGE UP
SARS-COV-2 RNA SPEC QL NAA+PROBE: SIGNIFICANT CHANGE UP
SODIUM SERPL-SCNC: 138 MMOL/L — SIGNIFICANT CHANGE UP (ref 135–145)
SODIUM SERPL-SCNC: 141 MMOL/L — SIGNIFICANT CHANGE UP (ref 135–145)
SODIUM SERPL-SCNC: 143 MMOL/L — SIGNIFICANT CHANGE UP (ref 135–145)
WBC # BLD: 11.5 K/UL — HIGH (ref 3.8–10.5)
WBC # BLD: 20.23 K/UL — HIGH (ref 3.8–10.5)
WBC # BLD: 23.25 K/UL — HIGH (ref 3.8–10.5)
WBC # FLD AUTO: 11.5 K/UL — HIGH (ref 3.8–10.5)
WBC # FLD AUTO: 20.23 K/UL — HIGH (ref 3.8–10.5)
WBC # FLD AUTO: 23.25 K/UL — HIGH (ref 3.8–10.5)

## 2024-01-01 PROCEDURE — 31500 INSERT EMERGENCY AIRWAY: CPT

## 2024-01-01 PROCEDURE — 72125 CT NECK SPINE W/O DYE: CPT | Mod: MC

## 2024-01-01 PROCEDURE — 83735 ASSAY OF MAGNESIUM: CPT

## 2024-01-01 PROCEDURE — 93005 ELECTROCARDIOGRAM TRACING: CPT | Mod: XU

## 2024-01-01 PROCEDURE — 80048 BASIC METABOLIC PNL TOTAL CA: CPT

## 2024-01-01 PROCEDURE — 36556 INSERT NON-TUNNEL CV CATH: CPT | Mod: GC,LT

## 2024-01-01 PROCEDURE — 36010 PLACE CATHETER IN VEIN: CPT | Mod: LT

## 2024-01-01 PROCEDURE — 74176 CT ABD & PELVIS W/O CONTRAST: CPT | Mod: MC

## 2024-01-01 PROCEDURE — P9016: CPT

## 2024-01-01 PROCEDURE — 71045 X-RAY EXAM CHEST 1 VIEW: CPT | Mod: 26,76

## 2024-01-01 PROCEDURE — 84295 ASSAY OF SERUM SODIUM: CPT

## 2024-01-01 PROCEDURE — 83605 ASSAY OF LACTIC ACID: CPT

## 2024-01-01 PROCEDURE — 99291 CRITICAL CARE FIRST HOUR: CPT | Mod: FS,25

## 2024-01-01 PROCEDURE — 71045 X-RAY EXAM CHEST 1 VIEW: CPT | Mod: 26,77

## 2024-01-01 PROCEDURE — 73560 X-RAY EXAM OF KNEE 1 OR 2: CPT | Mod: 26,RT

## 2024-01-01 PROCEDURE — 82947 ASSAY GLUCOSE BLOOD QUANT: CPT

## 2024-01-01 PROCEDURE — 72192 CT PELVIS W/O DYE: CPT | Mod: 26,59,MC

## 2024-01-01 PROCEDURE — 86900 BLOOD TYPING SEROLOGIC ABO: CPT

## 2024-01-01 PROCEDURE — 84100 ASSAY OF PHOSPHORUS: CPT

## 2024-01-01 PROCEDURE — 82803 BLOOD GASES ANY COMBINATION: CPT

## 2024-01-01 PROCEDURE — 73560 X-RAY EXAM OF KNEE 1 OR 2: CPT

## 2024-01-01 PROCEDURE — C1751: CPT

## 2024-01-01 PROCEDURE — 74176 CT ABD & PELVIS W/O CONTRAST: CPT | Mod: 26,MC

## 2024-01-01 PROCEDURE — 82330 ASSAY OF CALCIUM: CPT

## 2024-01-01 PROCEDURE — 32551 INSERTION OF CHEST TUBE: CPT | Mod: RT,GC

## 2024-01-01 PROCEDURE — 73502 X-RAY EXAM HIP UNI 2-3 VIEWS: CPT | Mod: 26,RT

## 2024-01-01 PROCEDURE — 84484 ASSAY OF TROPONIN QUANT: CPT

## 2024-01-01 PROCEDURE — 85014 HEMATOCRIT: CPT

## 2024-01-01 PROCEDURE — 70450 CT HEAD/BRAIN W/O DYE: CPT | Mod: 26,MC

## 2024-01-01 PROCEDURE — 96374 THER/PROPH/DIAG INJ IV PUSH: CPT

## 2024-01-01 PROCEDURE — 87637 SARSCOV2&INF A&B&RSV AMP PRB: CPT

## 2024-01-01 PROCEDURE — 36556 INSERT NON-TUNNEL CV CATH: CPT

## 2024-01-01 PROCEDURE — 36680 INSERT NEEDLE BONE CAVITY: CPT

## 2024-01-01 PROCEDURE — 85025 COMPLETE CBC W/AUTO DIFF WBC: CPT

## 2024-01-01 PROCEDURE — 94002 VENT MGMT INPAT INIT DAY: CPT

## 2024-01-01 PROCEDURE — 76377 3D RENDER W/INTRP POSTPROCES: CPT

## 2024-01-01 PROCEDURE — 71250 CT THORAX DX C-: CPT | Mod: MC

## 2024-01-01 PROCEDURE — 99291 CRITICAL CARE FIRST HOUR: CPT | Mod: 25

## 2024-01-01 PROCEDURE — 36430 TRANSFUSION BLD/BLD COMPNT: CPT

## 2024-01-01 PROCEDURE — 93010 ELECTROCARDIOGRAM REPORT: CPT

## 2024-01-01 PROCEDURE — 72125 CT NECK SPINE W/O DYE: CPT | Mod: 26,MC

## 2024-01-01 PROCEDURE — 73552 X-RAY EXAM OF FEMUR 2/>: CPT

## 2024-01-01 PROCEDURE — 82962 GLUCOSE BLOOD TEST: CPT

## 2024-01-01 PROCEDURE — 85730 THROMBOPLASTIN TIME PARTIAL: CPT

## 2024-01-01 PROCEDURE — 96375 TX/PRO/DX INJ NEW DRUG ADDON: CPT

## 2024-01-01 PROCEDURE — 80053 COMPREHEN METABOLIC PANEL: CPT

## 2024-01-01 PROCEDURE — 85610 PROTHROMBIN TIME: CPT

## 2024-01-01 PROCEDURE — 70450 CT HEAD/BRAIN W/O DYE: CPT | Mod: MC

## 2024-01-01 PROCEDURE — 71250 CT THORAX DX C-: CPT | Mod: 26,MC

## 2024-01-01 PROCEDURE — 84132 ASSAY OF SERUM POTASSIUM: CPT

## 2024-01-01 PROCEDURE — 82435 ASSAY OF BLOOD CHLORIDE: CPT

## 2024-01-01 PROCEDURE — 85027 COMPLETE CBC AUTOMATED: CPT

## 2024-01-01 PROCEDURE — 36620 INSERTION CATHETER ARTERY: CPT

## 2024-01-01 PROCEDURE — 86901 BLOOD TYPING SEROLOGIC RH(D): CPT

## 2024-01-01 PROCEDURE — 71045 X-RAY EXAM CHEST 1 VIEW: CPT

## 2024-01-01 PROCEDURE — 85018 HEMOGLOBIN: CPT

## 2024-01-01 PROCEDURE — 73502 X-RAY EXAM HIP UNI 2-3 VIEWS: CPT

## 2024-01-01 PROCEDURE — 76377 3D RENDER W/INTRP POSTPROCES: CPT | Mod: 26

## 2024-01-01 PROCEDURE — 73552 X-RAY EXAM OF FEMUR 2/>: CPT | Mod: 26,RT

## 2024-01-01 PROCEDURE — 36415 COLL VENOUS BLD VENIPUNCTURE: CPT

## 2024-01-01 PROCEDURE — 92950 HEART/LUNG RESUSCITATION CPR: CPT

## 2024-01-01 PROCEDURE — 86850 RBC ANTIBODY SCREEN: CPT

## 2024-01-01 RX ORDER — VASOPRESSIN 20 [USP'U]/ML
0.03 INJECTION INTRAVENOUS
Qty: 40 | Refills: 0 | Status: DISCONTINUED | OUTPATIENT
Start: 2024-01-01 | End: 2024-01-01

## 2024-01-01 RX ORDER — PANTOPRAZOLE SODIUM 20 MG/1
40 TABLET, DELAYED RELEASE ORAL DAILY
Refills: 0 | Status: DISCONTINUED | OUTPATIENT
Start: 2024-01-01 | End: 2024-01-01

## 2024-01-01 RX ORDER — DESMOPRESSIN ACETATE 0.1 MG/1
27 TABLET ORAL ONCE
Refills: 0 | Status: COMPLETED | OUTPATIENT
Start: 2024-01-01 | End: 2024-01-01

## 2024-01-01 RX ORDER — INSULIN LISPRO 100/ML
VIAL (ML) SUBCUTANEOUS EVERY 4 HOURS
Refills: 0 | Status: DISCONTINUED | OUTPATIENT
Start: 2024-01-01 | End: 2024-01-01

## 2024-01-01 RX ORDER — ONDANSETRON 8 MG/1
4 TABLET, FILM COATED ORAL ONCE
Refills: 0 | Status: COMPLETED | OUTPATIENT
Start: 2024-01-01 | End: 2024-01-01

## 2024-01-01 RX ORDER — PROTHROMBIN COMPLEX CONCENTRATE (HUMAN) 25.5; 16.5; 24; 22; 22; 26 [IU]/ML; [IU]/ML; [IU]/ML; [IU]/ML; [IU]/ML; [IU]/ML
4500 POWDER, FOR SOLUTION INTRAVENOUS ONCE
Refills: 0 | Status: COMPLETED | OUTPATIENT
Start: 2024-01-01 | End: 2024-01-01

## 2024-01-01 RX ORDER — ACETAMINOPHEN 500 MG
1000 TABLET ORAL EVERY 6 HOURS
Refills: 0 | Status: DISCONTINUED | OUTPATIENT
Start: 2024-01-01 | End: 2024-01-01

## 2024-01-01 RX ORDER — ROBINUL 0.2 MG/ML
0.2 INJECTION INTRAMUSCULAR; INTRAVENOUS ONCE
Refills: 0 | Status: COMPLETED | OUTPATIENT
Start: 2024-01-01 | End: 2024-01-01

## 2024-01-01 RX ORDER — MORPHINE SULFATE 50 MG/1
4 CAPSULE, EXTENDED RELEASE ORAL ONCE
Refills: 0 | Status: DISCONTINUED | OUTPATIENT
Start: 2024-01-01 | End: 2024-01-01

## 2024-01-01 RX ORDER — SODIUM CHLORIDE 9 MG/ML
500 INJECTION INTRAMUSCULAR; INTRAVENOUS; SUBCUTANEOUS ONCE
Refills: 0 | Status: DISCONTINUED | OUTPATIENT
Start: 2024-01-01 | End: 2024-01-01

## 2024-01-01 RX ORDER — NOREPINEPHRINE BITARTRATE/D5W 8 MG/250ML
0.05 PLASTIC BAG, INJECTION (ML) INTRAVENOUS
Qty: 8 | Refills: 0 | Status: DISCONTINUED | OUTPATIENT
Start: 2024-01-01 | End: 2024-01-01

## 2024-01-01 RX ORDER — NOREPINEPHRINE BITARTRATE/D5W 8 MG/250ML
1.5 PLASTIC BAG, INJECTION (ML) INTRAVENOUS
Qty: 8 | Refills: 0 | Status: DISCONTINUED | OUTPATIENT
Start: 2024-01-01 | End: 2024-01-01

## 2024-01-01 RX ORDER — DESMOPRESSIN ACETATE 0.1 MG/1
0.3 TABLET ORAL ONCE
Refills: 0 | Status: DISCONTINUED | OUTPATIENT
Start: 2024-01-01 | End: 2024-01-01

## 2024-01-01 RX ORDER — CHLORHEXIDINE GLUCONATE 213 G/1000ML
15 SOLUTION TOPICAL EVERY 12 HOURS
Refills: 0 | Status: DISCONTINUED | OUTPATIENT
Start: 2024-01-01 | End: 2024-01-01

## 2024-01-01 RX ORDER — NALOXONE HYDROCHLORIDE 4 MG/.1ML
2 SPRAY NASAL ONCE
Refills: 0 | Status: DISCONTINUED | OUTPATIENT
Start: 2024-01-01 | End: 2024-01-01

## 2024-01-01 RX ORDER — HYDROMORPHONE HYDROCHLORIDE 2 MG/ML
1 INJECTION INTRAMUSCULAR; INTRAVENOUS; SUBCUTANEOUS
Refills: 0 | Status: DISCONTINUED | OUTPATIENT
Start: 2024-01-01 | End: 2024-01-01

## 2024-01-01 RX ORDER — NOREPINEPHRINE BITARTRATE/D5W 8 MG/250ML
0.05 PLASTIC BAG, INJECTION (ML) INTRAVENOUS
Qty: 16 | Refills: 0 | Status: DISCONTINUED | OUTPATIENT
Start: 2024-01-01 | End: 2024-01-01

## 2024-01-01 RX ORDER — ACETAMINOPHEN 500 MG
1000 TABLET ORAL ONCE
Refills: 0 | Status: COMPLETED | OUTPATIENT
Start: 2024-01-01 | End: 2024-01-01

## 2024-01-01 RX ORDER — SODIUM CHLORIDE 9 MG/ML
1000 INJECTION, SOLUTION INTRAVENOUS
Refills: 0 | Status: DISCONTINUED | OUTPATIENT
Start: 2024-01-01 | End: 2024-01-01

## 2024-01-01 RX ORDER — CHLORHEXIDINE GLUCONATE 213 G/1000ML
1 SOLUTION TOPICAL
Refills: 0 | Status: DISCONTINUED | OUTPATIENT
Start: 2024-01-01 | End: 2024-01-01

## 2024-01-01 RX ORDER — SODIUM BICARBONATE 1 MEQ/ML
0.04 SYRINGE (ML) INTRAVENOUS
Qty: 75 | Refills: 0 | Status: DISCONTINUED | OUTPATIENT
Start: 2024-01-01 | End: 2024-01-01

## 2024-01-01 RX ADMIN — ONDANSETRON 4 MILLIGRAM(S): 8 TABLET, FILM COATED ORAL at 08:34

## 2024-01-01 RX ADMIN — CHLORHEXIDINE GLUCONATE 1 APPLICATION(S): 213 SOLUTION TOPICAL at 12:51

## 2024-01-01 RX ADMIN — Medication 400 MILLIGRAM(S): at 08:38

## 2024-01-01 RX ADMIN — PROTHROMBIN COMPLEX CONCENTRATE (HUMAN) 400 INTERNATIONAL UNIT(S): 25.5; 16.5; 24; 22; 22; 26 POWDER, FOR SOLUTION INTRAVENOUS at 12:00

## 2024-01-01 RX ADMIN — MORPHINE SULFATE 4 MILLIGRAM(S): 50 CAPSULE, EXTENDED RELEASE ORAL at 08:34

## 2024-01-01 RX ADMIN — VASOPRESSIN 4.5 UNIT(S)/MIN: 20 INJECTION INTRAVENOUS at 13:32

## 2024-01-01 RX ADMIN — Medication 255 MICROGRAM(S)/KG/MIN: at 12:49

## 2024-01-01 RX ADMIN — HYDROMORPHONE HYDROCHLORIDE 1 MILLIGRAM(S): 2 INJECTION INTRAMUSCULAR; INTRAVENOUS; SUBCUTANEOUS at 16:29

## 2024-01-01 RX ADMIN — ROBINUL 0.2 MILLIGRAM(S): 0.2 INJECTION INTRAMUSCULAR; INTRAVENOUS at 16:24

## 2024-01-01 RX ADMIN — Medication 4: at 12:50

## 2024-01-01 RX ADMIN — Medication 1 MILLIGRAM(S): at 16:30

## 2024-01-01 RX ADMIN — DESMOPRESSIN ACETATE 227 MICROGRAM(S): 0.1 TABLET ORAL at 11:00

## 2024-01-01 RX ADMIN — Medication 50 MEQ/KG/HR: at 13:32

## 2024-01-18 NOTE — ED CDU PROVIDER INITIAL DAY NOTE - CROS ED ROS STATEMENT
illness  Get plenty of rest   Get a flu shot each year.  Get a pneumococcal vaccine shot. If you have had one before, ask your doctor whether you need another dose.       My Goal for Self-management of Heart Failure Includes 5 steps :    1. Notice a change in symptoms ( weight gain, short of breath, leg swelling, decreased activity level, bloating....)    2. Evaluate the change: (use the Heart Failure Zones )     3. Decide to take action: decide what your options are, such as: (call your doctor for an extra visit, take a prescribed medication, such as your water pill if your doctor has given you directions to do so, CALL YOUR DOCTOR)    4. Come up with a strategy:  (now you call the doctor for advice / appointment. This is where you take action!!!  Do not wait, catch the symptom early and treat it before it worsens.    5. Evaluate the response: The next day, check your Heart Failure Zones: are you in the GREEN ZONE (safe zone)?  Worsening symptoms of YELLOW ZONE? Or have you moved to the RED ZONE and need to call 911 or go to the Emergency Room for evaluation? Call your doctor's office to update them on your symptoms of heart failure.             
all other ROS negative except as per HPI

## 2024-02-09 NOTE — ASU PREOPERATIVE ASSESSMENT, ADULT (IPARK ONLY) - TRANSPORT TO OR VIA
Radha from [facility] orthopedics called requesting a new referral to be placed for podiatry. Per Radha referral needs to be for Dr Edmondson located in the [place] office in order for pt to be seen. Referral placed, of appropriate please cosign.    ambulatory

## 2024-04-11 NOTE — ED ADULT NURSE NOTE - NSFALLHARMRISKINTERV_ED_ALL_ED

## 2024-04-11 NOTE — ED ADULT NURSE NOTE - OBJECTIVE STATEMENT
pt is a 78-year-old female with past medical history anxiety, hypertension, hyperlipidemia, type 2 diabetes, CKD, CAD with stents, A-fib on Xarelto, breast and lung CA status post right lobectomy approximately 3 weeks ago, coming by ems to the ED accompanied by  complaining of severe right hip pain status post fall prior to arrival. Pt states she was getting out of bed to stand when she fell- pt does not fully remember the events of the fall. fall was unwitnessed by . Pt is complaining of right hip pain/nausea. pt is a/ox 3- vitals stable.

## 2024-04-11 NOTE — ED PROCEDURE NOTE - CPROC ED COMPLICATIONS1
Initially inserted in Fem Artery/yes initial cannulation of femoral artery, corrected into femoral vein/yes

## 2024-04-11 NOTE — H&P PST ADULT - AIRWAY
This note was copied from a baby's chart.  Lactation Progress Note    Data: Follow up. Father called, stating mother ready to feed infant, requesting LC assist.    Action: LC to room. Mother resting in bed, holding infant skin to skin, who is awake and showing hunger cues. Mother requesting LC assist with feeding.    Reviewed feeding positions and latch on techniques. Encouraged lining nose to nipple, holding belly to belly and waiting for wide open mouth before quickly bringing baby onto breast to ensure a deep latch.  Discussed importance of obtaining deep latch to ensure proper milk transfer, establish milk production, maintain milk supply and improve maternal comfort. Reviewed  signs of milk transfer, output goals and normal feeding/sleeping behaviors for the first 24 hours and beyond.    Infant positioned to football hold on right breast, using pillows for support. Mother easily hand expressing colostrum to infant and he would latch on but not suck. After several attempts he became fussy.  LC suggested trying laid back position and mother agreed.  Colostrum hand expressed to infant to coax him to latch and he latched on, sustaining active sucking with audible swallows. Mother states latch feels comfortable, feels like pulling/tugging, denies pinching biting or pain. LC taught and mother returned demo for recognizing swallows (visual and/or audible) and satiety.  Encouraged unlimited time on first breast, burping and offering second breast when hunger cues are shown again. Mother states feels comfortable with position. Encouraged mother to call for LC as needed and she agreed.  Updated bedside RN.    Response:  Mother verbalizes understanding of information given and denies further needs at this time.  Mother states will call as needed.    Dara DALEYN, RN, IBCLC  Lactation Consultant    normal

## 2024-04-11 NOTE — ED ADULT NURSE NOTE - NSICDXPASTSURGICALHX_GEN_ALL_CORE_FT
PAST SURGICAL HISTORY:  Breast cancer left lumpectomy with post op chemo and RT    CAD (coronary artery disease) one cardiac stent placed in 1991 -- had surgery at [facility]    Hernia diaphramatic hernia age 8    Inguinal hernia right inguinal hernia repair    Recurrent breast cancer, left 1991 left mastectomy with reconstruction ("from the back") and the nipple -- no post op chemo or RT    S/P hernia repair     S/P lumpectomy, left breast     S/P mastectomy, left     S/P spinal fusion     Umbilical hernia

## 2024-04-11 NOTE — ED PROVIDER NOTE - WR ORDER ID 1
67 y/o man w CT c/a/p showing left renal upper/mid pole abnormality suspicious for neoplasm, 3+cm left liver mass w additional small lesions, necrotic left paraaortic LN, ale lung small lesions, recent [place] liver bx nondiagnostic.  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 Multifactorial. include due to malignancy/chronic ds/renal insuff/blood loss, iron studies not c/w iron deficiency, B12/folate adequate    -now able to void  -pt agreeable to repeat biopsy, will discuss w Interventional Radiology on Monday  -continue IV Heparin for now can eventually change to oral DOAC at time of discharge  -follow serial CBC [id number]

## 2024-04-11 NOTE — CONSULT NOTE ADULT - ASSESSMENT
78F history of afib on xarelto, CAD s/p stents (most recent 2019), CKD4, HTN, DM, HLD, breast cancer s/p resection, lung cancer s/p right robotic VATS with right middle lobectomy and mediastinal LN [facility] BIBEM[facility] this morning after unwitnessed fall. Was mentating normally after the fall and in the ED.  Was undergoing trauma workup with CT scans however when returned from radiology was found to be in PEA arrest. Trauma was activated at this time.     Patient had been intubated by the ED and was undergoing CPR. Had received multiple rounds of epinephrine without sonographic cardiac activity or palpable pulses. Norepinephrine was started. Pupils were 3mm, equal and sluggish. CT head scan were notable for right intracranial bleed vs mass. Other imaging noted for large right pleural effusion, age-indeterminate right rib fractures, right intertrochanteric femur fracture and right superior/inferior pubic rami fractures without pelvic hematoma.   pH was 7.1 and she had been treated for hyperkalemia to 7 on VBG. An emergent right chest tube was placed with ~400cc of serosanguinous fluid. Two units of emergency release blood were given. Neurology consulted for CT head findings.    Impression: CT head findings of well-defined hyperattenuating focus centered in the right middle cranial fossa, presumably extra-axial, focal hemorrhage vs. mass lesion on differential in the setting of hx of breast and lung cancer.     Recommendations:   -MR brain w and w/o contrast  -Would hold AC/AP at this time until brain hemorrhage ruled out with MRI  -Recommend NSGY consult if MR brain confirms mass  -Administer  2 mg IV Ativan or 5 mg IV valium PRN STAT for seizure activity or GTC > 3 minutes or significant derangement of vital signs.  -Administer 1500 mg IV Keppra over 15 minutes for seizures refractory to ativan/valium.  -Toxic/metabolic/infectious work up per primary team- CBC, CMP, urine toxicology, UA, urine cx, blood cx, alcohol level, CK, CPK, lactate level    Miscellaneous:  [] Q4H neurological checks and vital signs  [] Seizure, fall and aspiration precautions. Avoid sleep deprivation.  -If pt has a convulsion, please document accurately the lenght of episode and specifically what the pt was doing paying attention to eye blinking vs. closure, gaze deviation, shaking of extremities, tongue biting, urinary incontinence, any derangements of vital signs    To be discussed with neurology attending and will be formally staffed by attending during rounds. Recommendations will be finalized once signed by attending.      78F history of afib on xarelto, CAD s/p stents (most recent 2019), CKD4, HTN, DM, HLD, breast cancer s/p resection, lung cancer s/p right robotic VATS with right middle lobectomy and mediastinal LN dissection (3/6/24, [facility]) BIBEMS this morning after unwitnessed fall. Was mentating normally after the fall and in the ED.  Was undergoing trauma workup with CT scans however when returned from radiology was found to be in PEA arrest. Trauma was activated at this time.     Patient had been intubated by the ED and was undergoing CPR. Had received multiple rounds of epinephrine without sonographic cardiac activity or palpable pulses. Norepinephrine was started. Pupils were 3mm, equal and sluggish. CT head scan were notable for right intracranial bleed vs mass. Other imaging noted for large right pleural effusion, age-indeterminate right rib fractures, right intertrochanteric femur fracture and right superior/inferior pubic rami fractures without pelvic hematoma.   pH was 7.1 and she had been treated for hyperkalemia to 7 on VBG. An emergent right chest tube was placed with ~400cc of serosanguinous fluid. Two units of emergency release blood were given. Neurology consulted for CT head findings.    Impression: CT head findings of well-defined hyperattenuating focus centered in the right middle cranial fossa, presumably extra-axial, focal hemorrhage vs. mass lesion on differential in the setting of hx of breast and lung cancer.     Recommendations:   -MR brain w and w/o contrast  -Would hold AC/AP at this time until brain hemorrhage ruled out with MRI  -Recommend NSGY consult if MR brain confirms mass  -Recommend starting keppra 500 mg BID for seizure ppx.  -Administer  2 mg IV Ativan or 5 mg IV valium PRN STAT for seizure activity or GTC > 3 minutes or significant derangement of vital signs.  -Administer 1500 mg IV Keppra over 15 minutes for seizures refractory to ativan/valium.  -Toxic/metabolic/infectious work up per primary team- CBC, CMP, urine toxicology, UA, urine cx, blood cx, alcohol level, CK, CPK, lactate level    Miscellaneous:  [] Q4H neurological checks and vital signs  [] Seizure, fall and aspiration precautions. Avoid sleep deprivation.  -If pt has a convulsion, please document accurately the lenght of episode and specifically what the pt was doing paying attention to eye blinking vs. closure, gaze deviation, shaking of extremities, tongue biting, urinary incontinence, any derangements of vital signs    To be discussed with neurology attending and will be formally staffed by attending during rounds. Recommendations will be finalized once signed by attending.

## 2024-04-11 NOTE — ED PROVIDER NOTE - CLINICAL SUMMARY MEDICAL DECISION MAKING FREE TEXT BOX
Anthony 78-year-old female history of lung CA status post resection last month history of diabetes hypertension high cholesterol on Xarelto for A-fib with fall getting out of bed does not recall event [facility] patient complains of right hip pain severe unable to ambulate after fall on exam patient slow to mentate but GCS is 15 pupils equal round neck nontender midline C-spine but placed in c-collar for presumed distracting injury breath sounds bilateral hemodynamically stable abdomen soft nontender patient complains of nausea no vomiting pelvis stable right lower extremity shortened and externally rotated tender to lateral stroke left lower extremity full range of motion pain to groin with range of motion concern for right hip fracture in the setting of fall on AC will pan scan without contrast as patient has renal failure awaiting dialysis strength 5 out of 5 upper and lower extremities right lower unable to assess secondary to pain right lower extremity neurovascularly intact will need analgesia trauma to evaluate Ortho likely to admit if rest of trauma scan is negative will offer multimodal pain control with mixed analgesia and will offer fascia block after fracture confirmed radiographically

## 2024-04-11 NOTE — H&P ADULT - NSCORESITESY/N_GEN_A_CORE_RD
[facility], [facility]  Obstetrics and Gynecology  Prenatal Visit  Zuleyma Cardona MD    KEVAN Lundberg is a 39year old.o. [id number] 28w5d weeks. Here for routine prenatal visit and is without complaints.   Patient denies any regular uterine contractions, sponta After  () option. Discussed risks of  including uterine rupture with increased risks of  MORTALITY or morbidity which can include hypoxic brain injury. Discussed maternal risks that include hemorrhage.   Patient counseled on option No

## 2024-04-11 NOTE — ED PROCEDURE NOTE - CPROC ED INDICATIONS1
joint fracture
emergency venous access
cardiac arrest
respiratory failure
emergency venous access/hemodynamic monitoring/volume resuscitation

## 2024-04-11 NOTE — ED PROCEDURE NOTE - NS ED PROCEDURE ASSISTED BY
Supervision was available
Assistance was available/The procedure was performed independently
Supervision was available
The procedure was performed independently
Supervision was available

## 2024-04-11 NOTE — ED PROVIDER NOTE - ATTENDING APP SHARED VISIT CONTRIBUTION OF CARE
This was a shared visit with MARJORIE.  I have reviewed and discussed the case with the MARJORIE and agree with verified documentation unless otherwise documented.  I have independently spoken with and examined the patient and my documentation of history/pe and MDM are above.

## 2024-04-11 NOTE — PATIENT PROFILE ADULT - FALL HARM RISK - HARM RISK INTERVENTIONS

## 2024-04-11 NOTE — H&P ADULT - NSHPLABSRESULTS_GEN_ALL_CORE
11.0   11.50 )-----------( 246      ( 11 Apr 2024 09:16 )             38.1     04-11    138  |  107  |  55<H>  ----------------------------<  181<H>  6.7<HH>   |  19<L>  |  3.84<H>    Ca    9.3      11 Apr 2024 09:16  Mg     2.0     04-11    TPro  6.5  /  Alb  3.3  /  TBili  0.3  /  DBili  x   /  AST  43<H>  /  ALT  29  /  AlkPhos  109  04-11   LIVER FUNCTIONS - ( 11 Apr 2024 09:16 )  Alb: 3.3 g/dL / Pro: 6.5 g/dL / ALK PHOS: 109 U/L / ALT: 29 U/L / AST: 43 U/L / GGT: x           PT/INR - ( 11 Apr 2024 09:16 )   PT: 13.5 sec;   INR: 1.30 ratio         PTT - ( 11 Apr 2024 09:16 )  PTT:31.6 sec  ABO Interpretation: A (04-11-24 @ 10:10)  ABO Interpretation: A (04-11-24 @ 08:55)    IMAGING  CT C/A/P  IMPRESSION:  Limited noncontrast study.    A moderate to large loculated right pleural effusion with partial   atelectasis of the right lung.    An acute comminuted right intertrochanteric femur fracture with   surrounding intramuscular hematoma. Also nondisplaced acute fractures of   the right superior and inferior pubic rami. Age-indeterminate fractures   of multiple right ribs.    Findings were discussed with Dr. Quevedo 4/11/2024 9:50 AM by Dr. Castillo with   read back confirmation.    --- End of Report ---    IMPRESSION:    CT HEAD:  1.  Fairly well-defined hyperattenuating focus centered in the right   middle cranial fossa, presumably extra-axial. In the setting of trauma   with patient on anticoagulation, a focal hemorrhage should be suspected.   However, given the well-defined nature of the hyperattenuating focus, a   extra-axial mass lesion is another differential to consider. Recommend   close interval imaging and eventual MRI for further evaluation.  2.  Chronic small vessel disease.    CT CERVICAL SPINE:  1.  No evidence of acute osseous fracture or clovis dislocation.  2.  Multilevel degenerative change of the cervical spine.  3.  Partially visualized large right pleural effusion. Correlate with   same day chest CT for further evaluation.    Multiple attempts made to contact the ordering provider.    --- End of Report ---

## 2024-04-11 NOTE — H&P ADULT - ASSESSMENT
78-year-old female past medical history anxiety, hypertension, hyperlipidemia, type 2 diabetes, CKD, CAD with stents, A-fib on Xarelto, breast and lung CA. Pt had an unwitnessed fall this AM and was activated as a Level 1 trauma in the ED with the injuries below.     Injuries:  large right pleural effusion, right cranial fossa focal hemorrhage/mass, acute comminuted right intertrochanteric femur fracture, nondisplaced acute fractures of the right superior and inferior pubic rami    PLAN:  -Admit to SICU  -f/u official read of scans  -Discussed with Dr. Luther

## 2024-04-11 NOTE — ED ADULT NURSE NOTE - NSFALLRISKASMTTYPE_ED_ALL_ED
Goal Outcome Evaluation:   VSS. A&OX4. AV paced. Pt refusing turns at times overnight. BG improved after juice given. Overnight oximetry completed, oxygen had to be reapplied d/t pt c/o SOA. Call light in reach.    Initial (On Arrival)

## 2024-04-11 NOTE — ED PROVIDER NOTE - PHYSICAL EXAMINATION
GEN: Pt obese, uncomfortable appearing 2/2 pain, non-toxic, alert.  PSYCH: Affect and mood appropriate.  EYES: Sclera white w/o injection, EOMI, PERRLA.   ENT: Placed in c-collar in ED. No midline cspine ttp.  Airway patent.  RESP: No chest wall ttp. CTA b/l.  CARDIAC: RRR, S1, S2, no M/G/R.  ABD: Soft, NT.  MSK: RLE shortened and externally rotated. TTP R hip and groin. NV intact distally. No pelvic instability.  NEURO: Nonfocal.  VASC: Strong distal pulses. No pitting edema.  SKIN: No rashes or lesions.

## 2024-04-11 NOTE — ED PROVIDER NOTE - CARE PLAN
1 Principal Discharge DX:	Intertrochanteric fracture of right hip   Principal Discharge DX:	Intertrochanteric fracture of right hip  Secondary Diagnosis:	Cardiac arrest  Secondary Diagnosis:	Brain bleed  Secondary Diagnosis:	Renal failure, chronic  Secondary Diagnosis:	Hyperkalemia  Secondary Diagnosis:	Large pleural effusion

## 2024-04-11 NOTE — PROCEDURE NOTE - NSANTIMICROB_VASC_A_CORE
Spoke with patient. Two pt identifiers confirmed. Patient advised that her orders for her labs are in her chart and she just needs to stop by the office at her convenience to have them drawn. Pt verbalized understanding of information discussed w/ no further questions at this time. Yes

## 2024-04-11 NOTE — CONSULT NOTE ADULT - SUBJECTIVE AND OBJECTIVE BOX
HISTORY OF PRESENT ILLNESS:    79 y/o female with PMHx anxiety, HTN, HLD, T2DM, CKD, CAD with stents, A-fib on Xarelto, breast and lung CA s/p right lobectomy 3 weeks ago BIBA from home with complaint of right hip pain s/p unwitness fall. Patient reported falling upon attempting to get out of bed. While in CT she became unresponsive and pulseless at which point CPR with ACLS was performed for 14 minutes before obtaining ROSC. Patient was intubated, sedated, on vasopressors. CT positive for pelvic fracture and right intertrochanter with possible hemorraghic metastatic lesion in brain. Right chest tube was for pleural effusion and was given 2 units PRBC, Kcentra, and DDAVP.    PAST MEDICAL HISTORY: CAD (coronary artery disease)  MI (myocardial infarction)  Hypertension  Hypercholesterolemia  Atrial fibrillation  Reflux  Osteoporosis  Elevated WBC count  Breast cancer  Recurrent breast cancer, left  Anxiety  Lumbar spinal stenosis  Diabetes  Obesity  Sciatica neuralgia, left  Breast cancer  Myocardial infarct  CAD (coronary artery disease)  HTN (hypertension)  HLD (hyperlipidemia)  Diabetes mellitus  Atrial fibrillation  CAD (coronary artery disease)        PAST SURGICAL HISTORY: Breast cancer  Recurrent breast cancer, left  CAD (coronary artery disease)  Inguinal hernia  Hernia  Umbilical hernia  S/P mastectomy, left  S/P lumpectomy, left breast  S/P spinal fusion  S/P hernia repair      FAMILY HISTORY: No pertinent family history in first degree relatives  Family history of breast cancer (Mother)  Family history of emphysema (Father)        SOCIAL HISTORY: unknown     CODE STATUS: DNI        HOME MEDICATIONS:    ALLERGIES: IV Contrast (Anaphylaxis)  Percocet 10/325 (Other)  codeine (Other)      VITAL SIGNS:  ICU Vital Signs Last 24 Hrs  T(C): 36 (11 Apr 2024 15:00), Max: 36.7 (11 Apr 2024 07:44)  T(F): 96.8 (11 Apr 2024 15:00), Max: 98 (11 Apr 2024 07:44)  HR: 185 (11 Apr 2024 17:00) (69 - 185)  BP: 217/85 (11 Apr 2024 10:45) (140/93 - 217/85)  BP(mean): 122 (11 Apr 2024 10:45) (102 - 122)  ABP: -3/-3 (11 Apr 2024 17:00) (-3/-3 - 166/88)  ABP(mean): -3 (11 Apr 2024 17:00) (-3 - 118)  RR: 8 (11 Apr 2024 16:45) (3 - 23)  SpO2: 100% (11 Apr 2024 17:00) (76% - 100%)    O2 Parameters below as of 11 Apr 2024 10:30  Patient On (Oxygen Delivery Method): ventilator    O2 Concentration (%): 100        NEURO  Exam: Intubated, non responsive   acetaminophen   IVPB .. 1000 milliGRAM(s) IV Intermittent every 6 hours PRN Mild Pain (1 - 3)  HYDROmorphone  Injectable 1 milliGRAM(s) IV Push every 1 hour PRN Severe Pain (7 - 10)  LORazepam   Injectable 1 milliGRAM(s) IV Push every 1 hour PRN Air Hunger      RESPIRATORY  Mechanical Ventilation: Mode: AC/ CMV (Assist Control/ Continuous Mandatory Ventilation), RR (machine): 22, RR (patient): 22, TV (machine): 450, FiO2: 100, PEEP: 8, ITime: 0.9, MAP: 14, PIP: 29  ABG - ( 11 Apr 2024 15:48 )  pH: 7.11  /  pCO2: 42    /  pO2: 127   / HCO3: 13    / Base Excess: -15.6 /  SaO2: 99.0    Lactate: 7.1<-- 3.7<-- 4.4  Exam: Intubated, right pigtail placed with sanguinous output       CARDIOVASCULAR  VBG - ( 11 Apr 2024 10:02 )  pH: 6.93  /  pCO2: 75    /  pO2: 23    / HCO3: 16    / Base Excess: -16.6 /  SaO2: 19.3   Lactate: 4.4    Exam: Sinus tachycardia, Regular rate       GI/NUTRITION  Exam: Soft, non-distended     GENITOURINARY/RENAL  04-11 @ 07:01 - 04-11 @ 18:47  --------------------------------------------------------  IN:    Norepinephrine: 42.5 mL    Norepinephrine: 615.6 mL    PRBCs (Packed Red Blood Cells): 600 mL    Sodium Bicarbonate: 300 mL    Vasopressin: 19.2 mL  Total IN: 1577.3 mL    OUT:    Chest Tube (mL): 1900 mL    Indwelling Catheter - Urethral (mL): 0 mL  Total OUT: 1900 mL    Total NET: -322.7 mL        Weight (kg): 90.7 (04-11 @ 07:44)  04-11    141  |  108  |  58<H>  ----------------------------<  201<H>  6.3<HH>   |  10<LL>  |  4.13<H>    Ca    9.4      11 Apr 2024 16:06  Phos  7.6     04-11  Mg     2.0     04-11    TPro  5.5<L>  /  Alb  3.0<L>  /  TBili  1.0  /  DBili  x   /  AST  82<H>  /  ALT  63<H>  /  AlkPhos  109  04-11    [ X ] Sams catheter, indication: urine output monitoring in critically ill patient    HEMATOLOGIC  [ ] VTE Prophylaxis: no chemical VTE due to possible hemorraghic                           12.5   23.25 )-----------( 181      ( 11 Apr 2024 16:06 )             41.6     PT/INR - ( 11 Apr 2024 16:06 )   PT: 15.4 sec;   INR: 1.48 ratio         PTT - ( 11 Apr 2024 16:06 )  PTT:41.1 sec  Transfusion: [X ] PRBC x 2 	[ ] Platelets	[ ] FFP	[ ] Cryoprecipitate      INFECTIOUS DISEASES    RECENT CULTURES:      ENDOCRINE    CAPILLARY BLOOD GLUCOSE      POCT Blood Glucose.: 222 mg/dL (11 Apr 2024 12:34)  POCT Blood Glucose.: 155 mg/dL (11 Apr 2024 08:09)      PATIENT CARE ACCESS DEVICES:  [ ] Peripheral IV  [X ] Central Venous Line	[ ] R	[X ] L	X[ ] IJ	[ ] Fem	[ ] SC	Placed: in SICU 1130  [X ] Arterial Line		[ ] R	[X ] L	[ ] Fem	[ ] Rad	[X ] Ax	Placed: in SICU 1130  [ ] PICC:					[ ] Mediport  [X ] Urinary Catheter, Date Placed: In SICU 1206  [x] Necessity of urinary, arterial, and venous catheters discussed    OTHER MEDICATIONS: chlorhexidine 2% Cloths 1 Application(s) Topical <User Schedule>      IMAGING STUDIES: HISTORY OF PRESENT ILLNESS:    77 y/o female with PMHx anxiety, HTN, HLD, T2DM, CKD, CAD with stents, A-fib on Xarelto, breast and lung CA s/p right lobectomy 3 weeks ago BIBA from home with complaint of right hip pain s/p unwitness fall. Patient reported falling upon attempting to get out of bed. While in CT she became unresponsive and pulseless at which point CPR with ACLS was performed for 14 minutes before obtaining ROSC. Patient was intubated, sedated, on vasopressors. CT positive for pelvic fracture and right intertrochanteric with possible hemorraghic metastatic lesion in brain. Right chest tube was placed for pleural effusion and was given 2 units PRBC, Kcentra, and DDAVP. Patient transferred to SICU for further management of ROSC.     PAST MEDICAL HISTORY: CAD (coronary artery disease)  MI (myocardial infarction)  Hypertension  Hypercholesterolemia  Atrial fibrillation  Reflux  Osteoporosis  Elevated WBC count  Breast cancer  Recurrent breast cancer, left  Anxiety  Lumbar spinal stenosis  Diabetes  Obesity  Sciatica neuralgia, left  Breast cancer  Myocardial infarct  CAD (coronary artery disease)  HTN (hypertension)  HLD (hyperlipidemia)  Diabetes mellitus  Atrial fibrillation  CAD (coronary artery disease)        PAST SURGICAL HISTORY: Breast cancer  Recurrent breast cancer, left  CAD (coronary artery disease)  Inguinal hernia  Hernia  Umbilical hernia  S/P mastectomy, left  S/P lumpectomy, left breast  S/P spinal fusion  S/P hernia repair      FAMILY HISTORY: No pertinent family history in first degree relatives  Family history of breast cancer (Mother)  Family history of emphysema (Father)        SOCIAL HISTORY: unknown     CODE STATUS: DNI        HOME MEDICATIONS:    ALLERGIES: IV Contrast (Anaphylaxis)  Percocet 10/325 (Other)  codeine (Other)      VITAL SIGNS:  ICU Vital Signs Last 24 Hrs  T(C): 36 (11 Apr 2024 15:00), Max: 36.7 (11 Apr 2024 07:44)  T(F): 96.8 (11 Apr 2024 15:00), Max: 98 (11 Apr 2024 07:44)  HR: 185 (11 Apr 2024 17:00) (69 - 185)  BP: 217/85 (11 Apr 2024 10:45) (140/93 - 217/85)  BP(mean): 122 (11 Apr 2024 10:45) (102 - 122)  ABP: -3/-3 (11 Apr 2024 17:00) (-3/-3 - 166/88)  ABP(mean): -3 (11 Apr 2024 17:00) (-3 - 118)  RR: 8 (11 Apr 2024 16:45) (3 - 23)  SpO2: 100% (11 Apr 2024 17:00) (76% - 100%)    O2 Parameters below as of 11 Apr 2024 10:30  Patient On (Oxygen Delivery Method): ventilator    O2 Concentration (%): 100        NEURO  Exam: Intubated, non responsive   acetaminophen   IVPB .. 1000 milliGRAM(s) IV Intermittent every 6 hours PRN Mild Pain (1 - 3)  HYDROmorphone  Injectable 1 milliGRAM(s) IV Push every 1 hour PRN Severe Pain (7 - 10)  LORazepam   Injectable 1 milliGRAM(s) IV Push every 1 hour PRN Air Hunger      RESPIRATORY  Mechanical Ventilation: Mode: AC/ CMV (Assist Control/ Continuous Mandatory Ventilation), RR (machine): 22, RR (patient): 22, TV (machine): 450, FiO2: 100, PEEP: 8, ITime: 0.9, MAP: 14, PIP: 29  ABG - ( 11 Apr 2024 15:48 )  pH: 7.11  /  pCO2: 42    /  pO2: 127   / HCO3: 13    / Base Excess: -15.6 /  SaO2: 99.0    Lactate: 7.1<-- 3.7<-- 4.4  Exam: Intubated, right pigtail placed with sanguinous output       CARDIOVASCULAR  VBG - ( 11 Apr 2024 10:02 )  pH: 6.93  /  pCO2: 75    /  pO2: 23    / HCO3: 16    / Base Excess: -16.6 /  SaO2: 19.3   Lactate: 4.4    Exam: Sinus tachycardia, Regular rate       GI/NUTRITION  Exam: Soft, non-distended     GENITOURINARY/RENAL  04-11 @ 07:01  -  04-11 @ 18:47  --------------------------------------------------------  IN:    Norepinephrine: 42.5 mL    Norepinephrine: 615.6 mL    PRBCs (Packed Red Blood Cells): 600 mL    Sodium Bicarbonate: 300 mL    Vasopressin: 19.2 mL  Total IN: 1577.3 mL    OUT:    Chest Tube (mL): 1900 mL    Indwelling Catheter - Urethral (mL): 0 mL  Total OUT: 1900 mL    Total NET: -322.7 mL        Weight (kg): 90.7 (04-11 @ 07:44)  04-11    141  |  108  |  58<H>  ----------------------------<  201<H>  6.3<HH>   |  10<LL>  |  4.13<H>    Ca    9.4      11 Apr 2024 16:06  Phos  7.6     04-11  Mg     2.0     04-11    TPro  5.5<L>  /  Alb  3.0<L>  /  TBili  1.0  /  DBili  x   /  AST  82<H>  /  ALT  63<H>  /  AlkPhos  109  04-11    [ X ] Sams catheter, indication: urine output monitoring in critically ill patient    HEMATOLOGIC  [ ] VTE Prophylaxis: no chemical VTE due to possible hemorraghic                           12.5   23.25 )-----------( 181      ( 11 Apr 2024 16:06 )             41.6     PT/INR - ( 11 Apr 2024 16:06 )   PT: 15.4 sec;   INR: 1.48 ratio         PTT - ( 11 Apr 2024 16:06 )  PTT:41.1 sec  Transfusion: [X ] PRBC x 2 	[ ] Platelets	[ ] FFP	[ ] Cryoprecipitate      INFECTIOUS DISEASES    RECENT CULTURES:      ENDOCRINE    CAPILLARY BLOOD GLUCOSE      POCT Blood Glucose.: 222 mg/dL (11 Apr 2024 12:34)  POCT Blood Glucose.: 155 mg/dL (11 Apr 2024 08:09)      PATIENT CARE ACCESS DEVICES:  [ ] Peripheral IV  [X ] Central Venous Line	[ ] R	[X ] L	X[ ] IJ	[ ] Fem	[ ] SC	Placed: in SICU 1130  [X ] Arterial Line		[ ] R	[X ] L	[ ] Fem	[ ] Rad	[X ] Ax	Placed: in SICU 1130  [ ] PICC:					[ ] Mediport  [X ] Urinary Catheter, Date Placed: In SICU 1206  [x] Necessity of urinary, arterial, and venous catheters discussed    OTHER MEDICATIONS: chlorhexidine 2% Cloths 1 Application(s) Topical <User Schedule>      IMAGING STUDIES:

## 2024-04-11 NOTE — PROCEDURE NOTE - NSSIZEINFR_GEN_A_CORE
7
28
[Follow-Up] : a follow-up visit
[Pulmonary Embolism] : pulmonary embolism
[Pulmonary Hypertension] : pulmonary hypertension

## 2024-04-11 NOTE — H&P ADULT - ATTENDING COMMENTS
ATTENDING ATTESTATION:    Level 1 Trauma    78F history of afib on xarelto, CAD s/p stents (most recent 2019), CKD4, HTN, DM, HLD, breast cancer s/p resection, lung cancer s/p right robotic VATS with right middle lobectomy and mediastinal LN dissection (3/6/24, [facility]) BIBEMS this morning after unwitnessed fall. Was undergoing trauma workup with CT scans however when returned from radiology was found to be in PEA arrest. Trauma was activated at this time.     Patient had been intubated by the ED and was undergoing CPR with a layo device. ETCO2 noted. Had received multiple rounds of epinephrine without sonographic cardiac activity or palpable pulses. Norepinephrine was started.  Left tibial IO in place and left femoral triple lumen CVC being placed.  No pericardial effusion on POCUS.   Pupils were 3mm, equal and sluggish.  Exam only notable for externally rotated and shortened RLE.    CT scans were notable for right intracranial bleeding mass, large right pleural effusion, age-indeterminate right rib fractures, right intertrochanteric femur fracture and right superior/inferior pubic rami fractures without pelvic hematoma.   pH was 7.1 and she had been treated for hyperkalemia to 7 on VBG.     An emergent right chest tube was placed with ~400cc of serosanguinous fluid. Two units of emergency release blood were given.   Kcentra and DDAVP were called for and being prepared by pharmacy.  The patient developed sonographic cardiac activity and palpable pulses.     At this time the patient was accepted to the SICU for further care.     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   Discussed with SICU attending, Dr. Ba.       Total time spent in the critical care of this patient today (excluding teaching & procedures): 70 minutes    Over 50% of the total time was spent in discussion and coordination of care with consulting services, dietary and rehab services.    Kandice Luther MD  Acute Care Surgery

## 2024-04-11 NOTE — ED ADULT NURSE NOTE - NS ED NURSE LEVEL OF CONSCIOUSNESS SPEECH
Please ensure patients new IR case date is on Dr. Silverman's schedule in outlook as well as a message sent to IR to change the procedure date. 9/6/2023 for date of kyphopasty   
Speaking Coherently

## 2024-04-11 NOTE — CONSULT NOTE ADULT - ATTENDING COMMENTS
Patient seen and examined and agree with above.   78 year female s/p unwitnessed fall. The patient was a Level 1 trauma activation   Current management includes:  Neuro- pain currently controlled with current regiment  CV- Hyptension  Pulm - on       -goal SpO2 92%  GI-  ID-   Endocrine -   Dispo-

## 2024-04-11 NOTE — ED PROCEDURE NOTE - PROCEDURE ADDITIONAL DETAILS
confirmed on cxr
Emergency Department Focused Ultrasound performed at patient's bedside post arest - no cardiac activity , large rt pleural effusion  ,no obvious fluidin sánchez acls performed with return of cardiac activity no pulse -- pt startted on levophed with return of pulse0-

## 2024-04-11 NOTE — GOALS OF CARE CONVERSATION - ADVANCED CARE PLANNING - CONVERSATION DETAILS
Ms. Olvera is admitted to the ICU.   I spoke with the patient's daughter Ms. Reyna Zamora, who was identified as the health care proxy and we had an extensive conversation about the patient''s care and current condition.     Her daughter stated that she had a prior MOLST that stated she was DNR/DNI and would not want to live dependently on the ventilator. Patient's daughter states that she lives in [place], but is booking a flight to come to [place]. However, her daughter stated that she did not want her flight to prolong her mother's suffering and pain. Ms. Zamora provided the patient's prior MOLST and HCP paperwork. Advanced directives were reviewed and MOLST was completed, signed, and placed in chart. After this conversation decision was made to change status to DNR/Allow Natural Death with comfort measures only.     Above was reviewed with SICU attending physician Dr. Jesenia Alvarenga PA-C

## 2024-04-11 NOTE — ED PROVIDER NOTE - NSICDXPASTSURGICALHX_GEN_ALL_CORE_FT
PAST SURGICAL HISTORY:  Breast cancer left lumpectomy with post op chemo and RT    CAD (coronary artery disease) one cardiac stent placed in 1991 -- had surgery at [facility]    Hernia diaphramatic hernia age 8    Inguinal hernia right inguinal hernia repair    Recurrent breast cancer, left 1991 left mastectomy with reconstruction ("from the back") and the nipple -- no post op chemo or RT    S/P hernia repair     S/P lumpectomy, left breast     S/P mastectomy, left     S/P spinal fusion     Umbilical hernia

## 2024-04-11 NOTE — ED PROVIDER NOTE - OBJECTIVE STATEMENT
78-year-old female past medical history anxiety, hypertension, hyperlipidemia, type 2 diabetes, CKD, CAD with stents, A-fib on Xarelto, breast and lung CA status post right lobectomy approximately 3 weeks ago, presents to ED BIBEMS accompanied by  complaining of severe right hip pain status post fall prior to arrival.  Patient reports she was in the process of standing while getting out of bed when she fell.  States she struck her head on the floor.  Unknown LOC.  Fall was not witnessed by .  Unable to bear weight.  Endorsing recent generalized fatigue and some shortness of breath.  No chest pain.  Currently endorsing some nausea but no vomiting.  No fevers or chills.  No urinary complaints. 78-year-old female past medical history anxiety, hypertension, hyperlipidemia, type 2 diabetes, CKD, CAD with stents, A-fib on Xarelto, breast and lung CA status post right lobectomy approximately 3 weeks ago, presents to ED BIBEMS complaining of severe right hip pain status post unwitnessed fall prior to arrival.  Patient reports she was in the process of standing while getting out of bed when she fell.  States she struck her head on the floor.  Unknown LOC.  Unable to bear weight.  Endorsing recent generalized fatigue and some shortness of breath.  No chest pain.  Currently endorsing some nausea but no vomiting.  No fevers or chills.  No urinary complaints.

## 2024-04-11 NOTE — ED PROCEDURE NOTE - CPROC ED TIME OUT STATEMENT1
“Patient's name, , procedure and correct site were confirmed during the [place] Timeout.”
“Patient's name, , procedure and correct site were confirmed during the [place] Timeout.”
“Patient's name, , procedure and correct site were confirmed during the [place] Timeout.”

## 2024-04-11 NOTE — CONSULT NOTE ADULT - SUBJECTIVE AND OBJECTIVE BOX
78-year-old female past medical history anxiety, hypertension, hyperlipidemia, type 2 diabetes, CKD, CAD with stents, A-fib on Xarelto, breast and lung CA status post right lobectomy approximately 3 weeks ago p/w R hip pain s/p MF Patient denies head hit or LOC. Patient denies numbness or tingling in the RLE. Patient denies any other injuries.    In ED when fascial nerve block about to be administered patient became unresponsive and pulseless. Chest compressions initiated. Pt was intubated and sedated. Chest tube placed and shortly after ROSC achieved. patient transferred to SICU for further management    ROS: 10 point review of systems otherwise negative unless noted in HPI    PMH:  CAD (coronary artery disease)    MI (myocardial infarction)    Hypertension    Hypercholesterolemia    Atrial fibrillation    Reflux    Osteoporosis    Elevated WBC count    Breast cancer    Recurrent breast cancer, left    Anxiety    Lumbar spinal stenosis    Diabetes    Obesity    Sciatica neuralgia, left    Breast cancer    Myocardial infarct    CAD (coronary artery disease)    HTN (hypertension)    HLD (hyperlipidemia)    Diabetes mellitus    Atrial fibrillation    CAD (coronary artery disease)      PSH:  Breast cancer    Recurrent breast cancer, left    CAD (coronary artery disease)    Inguinal hernia    Hernia    Umbilical hernia    S/P mastectomy, left    S/P lumpectomy, left breast    S/P spinal fusion    S/P hernia repair      AH:  IV Contrast (Anaphylaxis)  Percocet 10/325 (Other)  codeine (Other)    Meds: See med rec    T(C): 36.7 (04-11-24 @ 07:44)  HR: 152 (04-11-24 @ 10:31)  BP: 140/93 (04-11-24 @ 10:31)  RR: 15 (04-11-24 @ 10:31)  SpO2: 76% (04-11-24 @ 10:31)  Wt(kg): --                        11.0   11.50 )-----------( 246      ( 11 Apr 2024 09:16 )             38.1     04-11    138  |  107  |  55<H>  ----------------------------<  181<H>  6.7<HH>   |  19<L>  |  3.84<H>    Ca    9.3      11 Apr 2024 09:16  Mg     2.0     04-11    TPro  6.5  /  Alb  3.3  /  TBili  0.3  /  DBili  x   /  AST  43<H>  /  ALT  29  /  AlkPhos  109  04-11      Urinalysis Basic - ( 11 Apr 2024 09:16 )    Color: x / Appearance: x / SG: x / pH: x  Gluc: 181 mg/dL / Ketone: x  / Bili: x / Urobili: x   Blood: x / Protein: x / Nitrite: x   Leuk Esterase: x / RBC: x / WBC x   Sq Epi: x / Non Sq Epi: x / Bacteria: x        PE  Gen: NAD, alert and oriented  Resp: Unlabored breathing  RLE: Skin intact, no ecchymosis,        SILT DP/SP/ Ioana/Saph,        +EHL/FHL/TA/Gastroc,        Knee/ankle painless ROM,        hip ROM limited 2/2 pain,       DP+,        soft compartments, no calf ttp,        +log roll.      Secondary:  No TTP over bony landmarks, SILT BL, ROM intact BL, distal pulses palpable.    Imaging:  XR demonstrating R hip fracture     Assessment/Plan:  78yFemale  with right intertrochanteric fracture. Patient had cardiac arrest in ED now with ROSC. Under SICU care intubated and with chest tube    - plan for OR for operative fixation when medically optomized  - NPO except medications  - IVF while NPO  - NWB RLE , bedrest  - Please hold chemical DVT ppx, SCDs okay  - FU Preop labs  - Appreciate SICU care  - Medical comanagement appreciated, please document clearanceteam  - discussed with attending who agrees with above

## 2024-04-11 NOTE — ED PROVIDER NOTE - NS ED ATTENDING STATEMENT MOD
This was a shared visit with the MARJORIE. I reviewed and verified the documentation. I have personally provided the amount of critical care time documented below concurrently with the resident/fellow.  This time excludes time spent on separate procedures and time spent teaching. I have reviewed the resident’s / fellow’s documentation and I agree with the history, exam, and assessment and plan of care.

## 2024-04-11 NOTE — ED ADULT NURSE REASSESSMENT NOTE - NS ED NURSE REASSESS COMMENT FT1
Pt arrived back from imaging and found to have no pulse around 0940- CPR initiated. Level 1 trauma initiated at 0941. Rosc achieved at 0958. See cardiac arrest and trauma flow sheet for details of event. Pt had a total of 5 epinephrines, 2 Bicarb pushes, and 2 calcium pushes. 2 emergent units of PRBC was initiated and given- and k centra/ddavp ordered and made by pharmacy to be given in SICU. a right chest tube was placed and drained 600 cc of serosanguinous fluid and a left femoral central line placed at bedside during acls. an IO placed in the left shin.

## 2024-04-11 NOTE — CONSULT NOTE ADULT - ASSESSMENT
77 y/o female ayan A-fib on Xarelto BIBA from home s/p unwitnessed fall + HS, unknown LOC with pelvic and right femur fracture who became unresponsive requiring CPR/ACLS with ROSC after 14 minutes, SICU consulted for further management and hemodynamic monitoring.     Neuro # Brain mass, possible bleed   -Off sedation, non responsive to physical stimuli.     Resp #Pleural effusion   - Intubated on PRVC  - Right Pigtail for hemothorax     CV #ROSC, Afib RVR   - Levo, Vaso  - Trend Lactate 7.1<-- 3.7<-- 4.4    GI  - NPO      # Metabolic acidosis   - Bolus 1.5 L NS   - Sodium Bicarb 50 ml/hr     Heme  - CBC   - PRBC x 2   - KCentra/DDAVP   - Hold VTE ppx     ID  - Ancef     Endo  - No active issues     MSK #Right intratrochanteric fracture with displacement, pubic rami fracture  - Ortho consulted

## 2024-04-11 NOTE — H&P ADULT - HISTORY OF PRESENT ILLNESS
TRAUMA SERVICE (Acute Care [facility] / [facility] - #9020) - CONSULT NOTE  --------------------------------------------------------------------------------------------    TRAUMA ACTIVATION LEVEL: Level 1    MECHANISM OF INJURY:   GCS 3      HPI: 78-year-old female past medical history anxiety, hypertension, hyperlipidemia, type 2 diabetes, CKD, CAD with stents, A-fib on Xarelto, breast and lung CA. Per friend, pt had an unwitnessed fall this AM. Was mentating normally after the fall and in the ED. Pt had obvious deformity of her R hip.     In ED after scans were performed pt transferred back to room and became unresponsive and pulseless. Chest compressions initiated. Pt was intubated and sedated. Chest tube placed and shortly after ROSC achieved. Patient transferred to SICU for further management.    Primary Survey:    A - intubated  B - cpr in progress  C - cpr in progress  D - GCS 3 a time of trauma activation  Exposure obtained      Secondary Survey:  transferred to SICU after ROSC achieved  Patient denies fevers/chills, denies lightheadedness/dizziness, denies SOB/chest pain, denies nausea/vomiting, denies constipation/diarrhea.  ***    ROS: 10-system review is otherwise negative except HPI above.      PAST MEDICAL & SURGICAL HISTORY:  CAD (coronary artery disease)      MI (myocardial infarction)  1999, then had cardiac stent inserted      Hypertension      Hypercholesterolemia      Atrial fibrillation      Reflux      Osteoporosis      Elevated WBC count  h/o having elevated WBCs -- as high as 13,000      Breast cancer  left breast cancer diagnose din 1986 -- had lumpectomy with post op chemo and RT      Recurrent breast cancer, left  diagnosed in 1991 -- had surgery and no post op chemo or RT      Anxiety      Lumbar spinal stenosis  November 2014      Diabetes  type II diagnosed in 2003 -- doesn't do finger sticks      Obesity      Sciatica neuralgia, left  in 2014 due to spinal stenosis      Breast cancer  s/p left lumpectomy and mastectomy      Myocardial infarct      CAD (coronary artery disease)  s/p 1 stent (1999)      HTN (hypertension)      HLD (hyperlipidemia)      Diabetes mellitus  type 2      Atrial fibrillation      CAD (coronary artery disease)      Breast cancer  left lumpectomy with post op chemo and RT      Recurrent breast cancer, left  1991 left mastectomy with reconstruction ("from the back") and the nipple -- no post op chemo or RT      CAD (coronary artery disease)  one cardiac stent placed in 1991 -- had surgery at [facility]      Inguinal hernia  right inguinal hernia repair      Hernia  diaphramatic hernia age 8      Umbilical hernia      S/P mastectomy, left      S/P lumpectomy, left breast      S/P spinal fusion      S/P hernia repair

## 2024-04-11 NOTE — DISCHARGE NOTE FOR THE EXPIRED PATIENT - HOSPITAL COURSE
77 y/o female with PMHx anxiety, HTN, HLD, T2DM, CKD, CAD with stents, A-fib on Xarelto, breast and lung CA s/p right lobectomy 3 weeks ago BIBA from home with complaint of right hip pain s/p unwitness fall. Patient reported falling upon attempting to get out of bed. While in CT she became unresponsive and pulseless at which point CPR with ACLS was performed for 14 minutes before obtaining ROSC. Patient was intubated, sedated, on vasopressors. CT positive for pelvic fracture and intertroch on right with possible hemorrghaic metastastic lesion in brain. Right chest tube was for pleural effusion and was given 2 units PRBC, Kcentra, and DDAVP. Patient was off sedation and continued to be unresponsive with right lateral fixed gaze. Patients daughter was contacted and informed staff that she is a DNR/DNI and patient withdrawal of care initiated. Patient  at 1654.   Patient is not a candidate for organ donation due to recent history of lung cancer.  77 y/o female with PMHx anxiety, HTN, HLD, T2DM, CKD, CAD with stents, A-fib on Xarelto, breast and lung CA s/p right lobectomy 3 weeks ago BIBA from home with complaint of right hip pain s/p unwitness fall. Patient reported falling upon attempting to get out of bed. While in CT she became unresponsive and pulseless at which point CPR with ACLS was performed for 14 minutes before obtaining ROSC. Patient was intubated, sedated, on vasopressors. CT positive for pelvic fracture and right hip intertrochanteric fracture with possible hemorraghic metastatic lesion in brain. Right chest tube was for pleural effusion and was given 2 units PRBC, Kcentra, and DDAVP. Patient was off sedation and continued to be unresponsive with right lateral fixed gaze. Patients daughter was contacted and informed staff that she is a DNR/DNI and patient withdrawal of care initiated. Patient  at 1654 confirmed with fixed dilated pupils, pulseless and SICU attending physician notified.   Patient is not a candidate for organ donation due to recent history of lung cancer.  79 y/o female with PMHx anxiety, HTN, HLD, T2DM, CKD, CAD with stents, A-fib on Xarelto, breast and lung CA s/p right lobectomy 3 weeks ago BIBA from home with complaint of right hip pain s/p unwitness fall. Patient reported falling upon attempting to get out of bed. While in CT she became unresponsive and pulseless at which point CPR with ACLS was performed for 14 minutes before obtaining ROSC. Patient was intubated, sedated, on vasopressors. CT positive for pelvic fracture and right hip intertrochanteric fracture with possible hemorraghic metastatic lesion in brain. Right chest tube was for pleural effusion and was given 2 units PRBC, Kcentra, and DDAVP. Patient was off sedation and continued to be unresponsive with right lateral fixed gaze. Patients daughter was contacted and informed staff that she is a DNR/DNI. Daughter provided HCP paperwork with prior MOLST stating patient was DNR/DNI. Withdrawal of care initiated. Patient  at 1654 confirmed with fixed dilated pupils, pulseless and SICU attending physician notified.   Patient is not a candidate for organ donation due to recent history of lung cancer.

## 2024-04-11 NOTE — ED PROVIDER NOTE - PROGRESS NOTE DETAILS
chart reviewed last h/h 8/28 xr with comminuted intratroch - ortho awaer with renal dysfunction and impendng future dialysis requesting medical admisison - pt followed at [facility] -will admit to unattached after cleared by trauma k on vbf 7.2 - no evidence of hyperk on ekg -- awaiting cmp as likley hemolyzed called to bedside on arrival from ct - greeted by US team in room, pt never received nerve block -  pt pulseless cpr intiiated lma placed pt hyperventilated - acls algorithm intiated - no cardiac activity on pocus large rt pleural effsuion code acls called code trauma acevedo - 2 units emergent release blood and kcentra ordered - pt intubated with glidescope- no response to narcan - or epi again possible hyperk on iniital vbg treated w ca cl, and nahco3 x2- pos return of cardiac activity no pericard effusion pseudo pea initiated high dose levophed - rt side chest tube placed by trauma - initial large serous return then serosanguinous=-  pos return of pulse and bp after aprox 15 minutes -- titrate down on levo - d/w trauma attending  will accept to sicu - micu not called at this time -- ct read during arrest - no traumatic abd injury - pos pelvic fx and intertroch on rt -- pos hemmoracic metastastic lesion in brain -- large rt pleural effusion --  pt was with her friend- I explained the seriousness of her condition , he does not know if she had molst or dnr paperwork but doesn't think she would want o be on machines recommend trying to find paperwork--further GOC discussion will be needed cmp k 6.7 was mod hemolysis cr 3.8 - elev trop - repeat vbg acidotic -  pco2 elevated - will need vent adjaustment in sicu - vbg repeat k nml range

## 2024-04-11 NOTE — CONSULT NOTE ADULT - SUBJECTIVE AND OBJECTIVE BOX
Neurology - Consult Note    -  Spectra: [id number] ([facility]), [phone] ([facility])  -    78F history of afib on xarelto, CAD s/p stents (most recent 2019), CKD4, HTN, DM, HLD, breast cancer s/p resection, lung cancer s/p right robotic VATS with right middle lobectomy and mediastinal LN dissection (3/6/24, [facility]) BIBEMS this morning after unwitnessed fall. Was mentating normally after the fall and in the ED.  Was undergoing trauma workup with CT scans however when returned from radiology was found to be in PEA arrest. Trauma was activated at this time.     Patient had been intubated by the ED and was undergoing CPR. Had received multiple rounds of epinephrine without sonographic cardiac activity or palpable pulses. Norepinephrine was started. Pupils were 3mm, equal and sluggish. CT head scan were notable for right intracranial bleed vs mass. Other imaging noted for large right pleural effusion, age-indeterminate right rib fractures, right intertrochanteric femur fracture and right superior/inferior pubic rami fractures without pelvic hematoma.   pH was 7.1 and she had been treated for hyperkalemia to 7 on VBG. An emergent right chest tube was placed with ~400cc of serosanguinous fluid. Two units of emergency release blood were given. Neurology consulted for CT head findings.      Review of Systems:    CONSTITUTIONAL: No fevers or chills  NEUROLOGICAL: +As stated in HPI above  SKIN: No itching, burning, rashes, or lesions   All other review of systems is negative unless indicated above.    Allergies:  IV Contrast (Anaphylaxis)  Percocet 10/325 (Other)  codeine (Other)      PMHx/PSHx/Family Hx: As above, otherwise see below   CAD (coronary artery disease)    MI (myocardial infarction)    Hypertension    Hypercholesterolemia    Atrial fibrillation    Reflux    Osteoporosis    Elevated WBC count    Breast cancer    Recurrent breast cancer, left    Anxiety    Lumbar spinal stenosis    Diabetes    Obesity    Sciatica neuralgia, left    Breast cancer    Myocardial infarct    CAD (coronary artery disease)    HTN (hypertension)    HLD (hyperlipidemia)    Diabetes mellitus    Atrial fibrillation    CAD (coronary artery disease)        Social Hx:  No current use of tobacco, alcohol, or illicit drugs  Lives with ***    Medications:  MEDICATIONS  (STANDING):  chlorhexidine 0.12% Liquid 15 milliLiter(s) Oral Mucosa every 12 hours  chlorhexidine 2% Cloths 1 Application(s) Topical <User Schedule>  insulin lispro (ADMELOG) corrective regimen sliding scale   SubCutaneous every 4 hours  naloxone Injectable 2 milliGRAM(s) IV Push once  norepinephrine Infusion 1.5 MICROgram(s)/kG/Min (255 mL/Hr) IV Continuous <Continuous>  pantoprazole  Injectable 40 milliGRAM(s) IV Push daily  sodium bicarbonate  Infusion 0.041 mEq/kG/Hr (50 mL/Hr) IV Continuous <Continuous>  vasopressin Infusion 0.03 Unit(s)/Min (4.5 mL/Hr) IV Continuous <Continuous>    MEDICATIONS  (PRN):  acetaminophen   IVPB .. 1000 milliGRAM(s) IV Intermittent every 6 hours PRN Mild Pain (1 - 3)      Vitals:  T(C): 36.1 (04-11-24 @ 11:00), Max: 36.7 (04-11-24 @ 07:44)  HR: 71 (04-11-24 @ 13:00) (69 - 155)  BP: 217/85 (04-11-24 @ 10:45) (140/93 - 217/85)  RR: 22 (04-11-24 @ 13:00) (7 - 23)  SpO2: 94% (04-11-24 @ 13:00) (76% - 99%)    Physical Examination: INCOMPLETE  General - NAD  Cardiovascular - Peripheral pulses palpable, no edema  Eyes - Fundoscopy with flat, sharp optic discs and no hemorrhage or exudates; Fundoscopy not well visualized; Fundoscopy not performed due to safety precautions in the setting of the COVID-19 pandemic    Neurologic Exam:  Mental status - Awake, Alert, Oriented to person, place, and time. Speech fluent, repetition and naming intact. Follows simple and complex commands. Attention/concentration, recent and remote memory (including registration and recall), and fund of knowledge intact    Cranial nerves - PERRLA, VFF, EOMI, face sensation (V1-V3) intact b/l, facial strength intact without asymmetry b/l, hearing intact b/l, palate with symmetric elevation, trapezius OR sternocleidomastiod 5/5 strength b/l, tongue midline on protrusion with full lateral movement    Motor - Normal bulk and tone throughout. No pronator drift.  Strength testing            Deltoid      Biceps      Triceps     Wrist Extension    Wrist Flexion     Interossei         R            5                 5               5                     5                              5                        5                 5  L             5                 5               5                     5                              5                        5                 5              Hip Flexion    Hip Extension    Knee Flexion    Knee Extension    Dorsiflexion    Plantar Flexion  R              5                           5                       5                           5                            5                          5  L              5                           5                        5                           5                            5                          5    Sensation - Light touch/temperature OR pain/vibration intact throughout    DTR's -             Biceps      Triceps     Brachioradialis      Patellar    Ankle    Toes/plantar response  R             2+             2+                  2+                       2+            2+                 Down  L              2+             2+                 2+                        2+           2+                 Down    Coordination - Finger to Nose intact b/l. No tremors appreciated    Gait and station - Normal casual gait. Romberg (-)    Labs:                        12.1   20.23 )-----------( 178      ( 11 Apr 2024 11:09 )             40.1     04-11    143  |  110<H>  |  56<H>  ----------------------------<  286<H>  5.3   |  13<L>  |  3.77<H>    Ca    10.0      11 Apr 2024 11:09  Phos  8.0     04-11  Mg     2.1     04-11    TPro  6.5  /  Alb  3.3  /  TBili  0.3  /  DBili  x   /  AST  43<H>  /  ALT  29  /  AlkPhos  109  04-11    CAPILLARY BLOOD GLUCOSE      POCT Blood Glucose.: 155 mg/dL (11 Apr 2024 08:09)    LIVER FUNCTIONS - ( 11 Apr 2024 09:16 )  Alb: 3.3 g/dL / Pro: 6.5 g/dL / ALK PHOS: 109 U/L / ALT: 29 U/L / AST: 43 U/L / GGT: x             PT/INR - ( 11 Apr 2024 09:16 )   PT: 13.5 sec;   INR: 1.30 ratio         PTT - ( 11 Apr 2024 09:16 )  PTT:31.6 sec  CSF:                  Radiology:  CT Head No Cont:  (11 Apr 2024 09:38)    ACC: [id number] EXAM:  CT BRAIN   ORDERED BY: MARIAELENA MACK     ACC: [id number] EXAM:  CT CERVICAL SPINE   ORDERED BY: MARIAELENA MACK     PROCEDURE DATE:  04/11/2024          INTERPRETATION:  EXAM: CT HEAD AND CERVICAL SPINE WITHOUT INTRAVENOUS   CONTRAST    HISTORY: Trauma, status post fall, currently on anticoagulation    TECHNIQUE: Multiple axial images were obtained of the head and cervical   spine. Sagittal and coronal reformatted images were obtained from the   axial data set. The images were reviewed in brain and bone windows.    COMPARISON: No prior studies are available for comparison.    FINDINGS:    Studies are somewhat limited due to motion artifact.    CT HEAD:    Fairly well-defined hyperattenuating focus centered in the right middle   cranial fossa, presumably extra-axial, measuring approximately 4.4 x 3.4   x 4.0 cm (AP x TV x CC). This exhibits mass effect on the adjacent right   temporal lobe with surrounding edematous change    Areas of decreased attenuation throughout the deep and periventricular   white matter, compatible with chronic small vessel disease. Mild   parenchymal volume loss. No hydrocephalus. The extra-axial spaces and   basal cisterns are within normal limits. No midline shift or mass effect   present.    The cranial cervical junction is within normal limits. The sella is not   expanded. No depressed calvarial fracture. Mild mucosal thickening in the   ethmoid air cells and sphenoid sinuses. The visualized mastoid air cells   are well aerated. Theleft orbit is unremarkable. The right orbit status   post cataract surgery.    CT CERVICAL SPINE:  The atlantodental interval is within normal limits. The lateral masses   are properly aligned. Osseous fusion to the C1 lateral masses in the   occipital condyles. No facet subluxation or malalignment at the   craniovertebral or atlantoaxial articulations. No dens fracture. No   significant prevertebral soft tissue swelling.    There is a cervical lordosis. Mild anterolisthesis of C3 on C4 and C4 on  C5 and to a lesser degree C7 on T1. Vertebral body height is   well-maintained. Reduced intervertebral disc height with chronic endplate   change, most significantly at the C5-C6 level. No jumped or perched   facets. No acute osseous fracture.    C2-C3: No large disc bulge. The bilateral neuroforamina are patent. No   narrowing of the spinal canal. Facet arthropathy.    C3-C4: No large disc bulge. Moderate narrowing of the right neural   foramen. Left neuroforamen is patent. Mild narrowing of the spinal canal.   Facet arthropathy.    C4-C5: No large disc bulge. The right neural foramen is patent. Moderate   to severe narrowing of the left neuroforamen. Mild narrowing of the   spinal canal. Facet arthropathy.    C5-C6: Disc osteophyte complex with uncovertebral joint hypertrophy.   Moderate to severe narrowing of the bilateral neuroforamen. Mild to   moderate narrowing of the spinal canal. Facet arthropathy.    C6-C7: Disc osteophyte complex with uncovertebral joint hypertrophy. Mild   narrowing of the bilateral neuroforamen. Mild narrowing of the spinal   canal. Facet arthropathy.    C7-T1: No large disc bulge. The bilateral neuroforamina are patent. No   significant narrowing of the spinal canal. Facet arthropathy.    The paraspinal muscles are unremarkable.    Visualized portions of the thyroid are unremarkable.    Partially visualized large right pleural effusion.      IMPRESSION:    CT HEAD:  1.  Fairly well-defined hyperattenuating focus centered in the right   middle cranial fossa, presumably extra-axial. In the setting of trauma   with patient on anticoagulation, a focal hemorrhage should be suspected.   However, given the well-defined nature of the hyperattenuating focus, a   extra-axial mass lesion is another differential to consider. Recommend   close interval imaging and eventual MRI for further evaluation.  2.  Chronic small vessel disease.    CT CERVICAL SPINE:  1.  No evidence of acute osseous fracture or clovis dislocation.  2.  Multilevel degenerative change of the cervical spine.  3.  Partially visualized large right pleural effusion. Correlate with   same day chest CT for further evaluation.    Multiple attempts made to contact the ordering provider.    --- End of Report ---

## 2024-04-11 NOTE — CONSULT NOTE ADULT - ASSESSMENT
78F on Plavix/Xarelto Hx T2DM, CKD/ESRD, CAD w/ prior stents, A-fib, breast/lung Ca (s/p Right lung rsxn). Presents s/p fall this AM. After CT scans, pt was returned to ED room at which point she became unresponsive and pulseless. RRT initiated, and ROSC was obtained after 14 mins, and pt was transferred to SICU. pre-arrest CTH showed 3.6cm Rt temporal mass lesion likely hemorrhagic met. Unable to obtain exam 2/2 code, heavy sedation.  -Kcentra and DDAVP given in SICU  -Per SICU, Family is planning to withdraw care in the next hour or so.  -if GOC do not go that direction, SICU will obtain rpt CTH to ensure no expansion of heme. 78F on Plavix/Xarelto Hx T2DM, CKD/ESRD, CAD w/ prior stents, A-fib, breast/lung Ca (s/p Right lung rsxn). Presents s/p fall this AM. After CT scans, pt was returned to ED room at which point she became unresponsive and pulseless. RRT initiated, and ROSC was obtained after 14 mins, and pt was transferred to SICU. pre-arrest CTH showed 3.6cm Rt temporal mass lesion likely hemorrhagic met. Unable to obtain exam 2/2 code.  -Kcentra and DDAVP given in SICU  -Per SICU, Family is planning to withdraw care in the next hour or so.  -if GOC do not go that direction, we will obtain rpt exam and SICU will obtain rpt CTH to ensure no expansion of heme.

## 2024-04-11 NOTE — PROCEDURE NOTE - NSPOSTPRCRAD_GEN_A_CORE
chest tube in correct position
central line located in the/central line located in the superior vena cava/depth of insertion/line adjusted to depth of insertion/no pneumothorax/post-procedure radiography performed

## 2024-04-11 NOTE — AIRWAY REMOVAL NOTE  ADULT & PEDS - ARTIFICAL AIRWAY REMOVAL COMMENTS
Written order for extubation verified. The patient was identified by full name and birth date compared to the identification band.  Present during the procedure was Ciara Rivera RN

## 2024-04-11 NOTE — ED PROCEDURE NOTE - CPROC ED INFUS LINE DETAIL1
The guidewire was recovered./All lumen(s) aspirated and flushed without difficulty. anatomical landmark technique/The guidewire was recovered./All lumen(s) aspirated and flushed without difficulty.
